# Patient Record
Sex: MALE | Race: WHITE | Employment: OTHER | URBAN - NONMETROPOLITAN AREA
[De-identification: names, ages, dates, MRNs, and addresses within clinical notes are randomized per-mention and may not be internally consistent; named-entity substitution may affect disease eponyms.]

---

## 2017-06-05 DIAGNOSIS — R06.00 DYSPNEA: Primary | ICD-10-CM

## 2017-06-07 ENCOUNTER — HOSPITAL ENCOUNTER (OUTPATIENT)
Dept: ULTRASOUND IMAGING | Facility: HOSPITAL | Age: 67
Discharge: HOME | End: 2017-06-07
Attending: FAMILY MEDICINE | Admitting: FAMILY MEDICINE
Payer: MEDICARE

## 2017-06-07 PROCEDURE — 93306 TTE W/DOPPLER COMPLETE: CPT | Mod: 26 | Performed by: INTERNAL MEDICINE

## 2017-06-07 PROCEDURE — 93306 TTE W/DOPPLER COMPLETE: CPT | Mod: TC

## 2017-06-12 DIAGNOSIS — R06.00 DYSPNEA: Primary | ICD-10-CM

## 2017-06-19 DIAGNOSIS — R06.02 SOB (SHORTNESS OF BREATH): Primary | ICD-10-CM

## 2017-06-20 DIAGNOSIS — M17.12 PRIMARY OSTEOARTHRITIS OF LEFT KNEE: Primary | ICD-10-CM

## 2017-06-20 DIAGNOSIS — M25.562 LEFT KNEE PAIN: ICD-10-CM

## 2017-06-22 DIAGNOSIS — R06.02 SHORTNESS OF BREATH: Primary | ICD-10-CM

## 2017-06-23 ENCOUNTER — HOSPITAL ENCOUNTER (OUTPATIENT)
Dept: NUCLEAR MEDICINE | Facility: HOSPITAL | Age: 67
Discharge: HOME | End: 2017-06-23
Attending: FAMILY MEDICINE | Admitting: FAMILY MEDICINE
Payer: MEDICARE

## 2017-06-23 PROCEDURE — 78452 HT MUSCLE IMAGE SPECT MULT: CPT | Mod: TC

## 2017-06-23 PROCEDURE — 93017 CV STRESS TEST TRACING ONLY: CPT | Mod: TC

## 2017-06-23 PROCEDURE — A9500 TC99M SESTAMIBI: HCPCS | Mod: TC

## 2017-06-23 PROCEDURE — 93016 CV STRESS TEST SUPVJ ONLY: CPT | Performed by: INTERNAL MEDICINE

## 2017-06-23 PROCEDURE — 93018 CV STRESS TEST I&R ONLY: CPT | Performed by: INTERNAL MEDICINE

## 2017-06-23 RX ORDER — REGADENOSON 0.08 MG/ML
0.4 INJECTION, SOLUTION INTRAVENOUS ONCE
Status: COMPLETED | OUTPATIENT
Start: 2017-06-23 | End: 2017-06-23

## 2017-06-23 RX ADMIN — REGADENOSON 0.4 MG: 0.08 INJECTION, SOLUTION INTRAVENOUS at 10:11

## 2017-07-04 ENCOUNTER — HOSPITAL ENCOUNTER (EMERGENCY)
Facility: HOSPITAL | Age: 67
Discharge: HOME | End: 2017-07-04
Attending: NURSE PRACTITIONER | Admitting: NURSE PRACTITIONER
Payer: MEDICARE

## 2017-07-04 VITALS
HEART RATE: 72 BPM | DIASTOLIC BLOOD PRESSURE: 91 MMHG | TEMPERATURE: 97.4 F | OXYGEN SATURATION: 97 % | RESPIRATION RATE: 18 BRPM | SYSTOLIC BLOOD PRESSURE: 155 MMHG

## 2017-07-04 DIAGNOSIS — S42.292A FRACTURE OF HUMERAL HEAD, LEFT, CLOSED, INITIAL ENCOUNTER: ICD-10-CM

## 2017-07-04 PROCEDURE — 99213 OFFICE O/P EST LOW 20 MIN: CPT | Performed by: NURSE PRACTITIONER

## 2017-07-04 PROCEDURE — 99213 OFFICE O/P EST LOW 20 MIN: CPT | Mod: 25

## 2017-07-04 PROCEDURE — 96372 THER/PROPH/DIAG INJ SC/IM: CPT

## 2017-07-04 PROCEDURE — 25000128 H RX IP 250 OP 636: Performed by: NURSE PRACTITIONER

## 2017-07-04 PROCEDURE — 73030 X-RAY EXAM OF SHOULDER: CPT | Mod: TC,LT

## 2017-07-04 PROCEDURE — 25000132 ZZH RX MED GY IP 250 OP 250 PS 637: Mod: GY | Performed by: NURSE PRACTITIONER

## 2017-07-04 PROCEDURE — A9270 NON-COVERED ITEM OR SERVICE: HCPCS | Mod: GY | Performed by: NURSE PRACTITIONER

## 2017-07-04 RX ORDER — OXYCODONE HYDROCHLORIDE AND ACETAMINOPHEN 5; 325 MG/1; MG/1
1 TABLET ORAL ONCE
Status: DISCONTINUED | OUTPATIENT
Start: 2017-07-04 | End: 2017-07-04 | Stop reason: HOSPADM

## 2017-07-04 RX ORDER — OXYCODONE AND ACETAMINOPHEN 5; 325 MG/1; MG/1
1-2 TABLET ORAL EVERY 4 HOURS PRN
Qty: 20 TABLET | Refills: 0 | Status: SHIPPED | OUTPATIENT
Start: 2017-07-04 | End: 2018-09-23

## 2017-07-04 RX ORDER — OXYCODONE AND ACETAMINOPHEN 5; 325 MG/1; MG/1
2 TABLET ORAL ONCE
Status: COMPLETED | OUTPATIENT
Start: 2017-07-04 | End: 2017-07-04

## 2017-07-04 RX ADMIN — OXYCODONE HYDROCHLORIDE AND ACETAMINOPHEN 2 TABLET: 5; 325 TABLET ORAL at 20:12

## 2017-07-04 RX ADMIN — HYDROMORPHONE HYDROCHLORIDE 1 MG: 1 INJECTION, SOLUTION INTRAMUSCULAR; INTRAVENOUS; SUBCUTANEOUS at 18:55

## 2017-07-04 ASSESSMENT — ENCOUNTER SYMPTOMS
ARTHRALGIAS: 1
CONSTITUTIONAL NEGATIVE: 1

## 2017-07-04 NOTE — ED AVS SNAPSHOT
[facility]    [address]    [place] [address]    Phone:  [phone]                                       Serafin Velasquez   MRN: [id number]    Department:  [facility]   Date of Visit:  7/4/2017           After Visit Summary Signature Page     I have received my discharge instructions, and my questions have been answered. I have discussed any challenges I see with this plan with the nurse or doctor.    ..........................................................................................................................................  Patient/Patient Representative Signature      ..........................................................................................................................................  Patient Representative Print Name and Relationship to Patient    ..................................................               ................................................  Date                                            Time    ..........................................................................................................................................  Reviewed by Signature/Title    ...................................................              ..............................................  Date                                                            Time

## 2017-07-04 NOTE — ED AVS SNAPSHOT
[facility]    [address] [address]    Phone:  [phone]                                       Serafin Velasquez   MRN: [id number]    Department:  [facility]   Date of Visit:  7/4/2017           Patient Information     Date Of Birth          1950        Your diagnoses for this visit were:     Fracture of humeral head, left, closed, initial encounter        You were seen by Carlee Calabrese NP.      Follow-up Information     Follow up with [facility].    Specialty:  EMERGENCY MEDICINE    Why:  As needed, If symptoms worsen, or concerns develop    Contact information:    [address] [address] [phone]    Additional information:    From [facility]: Take [address]. Turn left at [address]. Turn left at the first stoplight on [address]. At the first stop sign, take a right onto [address]. Take a left into the parking lot and continue through until you reach the North enterance of the building.       From [place]: Take [address]. Take the [address] ramp towards [place]. Turn left onto [address]. Take a slight right onto [address]. Turn left at the first stoplight on [address]. At the first stop sign, take a right onto [address]. Take a left into the parking lot and continue through until you reach the North enterance of the building.       From [place]: Take [address]. Take a right at [address]. At the first stop sign, take a right onto [address]. Take a left into the parking lot and continue through until you reach the North enterance of the building.         Follow up with [facility]. Call on 7/5/2017.    Why:  [phone] to schedule an appointment for follow up this week in clinic        Discharge Instructions         Understanding a Humerus Fracture      When you have a humerus fracture, it means that  your upper arm bone is broken.This type of fracture most often occurs along the middle of the bone or at the end of the bone near the shoulder. Less often, it occurs at the end of the bone near the elbow. This mainly happens in kids or young adults.  The bone may be cracked, or it may be broken into 2 or more pieces. The pieces of bone may be lined up or they may have moved out of place. Sometimes, the bone may break through the skin. Nearby nerves, tissues, and joints also may be damaged. Depending on the severity of the fracture, healing may take several months or longer.  What causes a humerus fracture?  A humerus fracture is most often the result of trauma. This may be from a fall, blow, accident, or sports injury.  Symptoms of a humerus fracture  Symptoms can include pain, swelling, and bruising. If the bone breaks through the skin,  bleeding can occur at the site. It may be hard to move and use the shoulder, arm, or elbow as you would normally.  Treating a humerus fracture  Treatment depends on where the bone is broken and how serious the break is. If needed, the bone is put back into place. This may be done with or without surgery. If surgery is needed, the surgeon may use devices such as pins, plates, or screws to hold the bone together. You will then wear a sling, splint, or cast to keep the bone in place and protect it from injury during healing. Other treatments may be also used to help reduce symptoms or regain function. These include:    Cold packs. Putting an ice pack on the injured area may help reduce swelling and pain.    Pain medicines. Taking prescription or over-the-counter pain medicines may help reduce pain and swelling.    Exercises. Doing certain exercises at home or with a physical therapist can help improve strength, flexibility, and range of motion in the shoulder, arm, or elbow.  Possible complications of a humerus fracture  These can include:    Poor healing of the bone    Weakness,  stiffness, or loss of range of motion in the shoulder, arm, or elbow    Osteoarthritis in the shoulder or elbow  When to call your healthcare provider  Call your healthcare provider right away if you have any of these:    Fever of 100.4 F (38 C) or higher, or as directed    Symptoms that don t get better with treatment, or get worse    Numbness, tingling, coldness, or swelling in your arm, hand, or fingers    Fingernails that turn blue or gray in color    A sling, splint, or cast that is damaged or feels too tight or loose    New symptoms   Date Last Reviewed: 3/10/2016    5874-1134 [employer]. [address] [place] [address]. All rights reserved. This information is not intended as a substitute for professional medical care. Always follow your healthcare professional's instructions.             Review of your medicines      START taking        Dose / Directions Last dose taken    oxyCODONE-acetaminophen 5-325 MG per tablet   Commonly known as:  PERCOCET   Dose:  1-2 tablet   Quantity:  20 tablet        Take 1-2 tablets by mouth every 4 hours as needed for pain   Refills:  0          Our records show that you are taking the medicines listed below. If these are incorrect, please call your family doctor or clinic.        Dose / Directions Last dose taken    fish oil-omega-3 fatty acids 1000 MG capsule   Dose:  2 g        Take 2 g by mouth daily.   Refills:  0        IMDUR 30 MG 24 hr tablet   Dose:  30 mg   Generic drug:  isosorbide mononitrate        Take 30 mg by mouth daily.   Refills:  0        LIPITOR 20 MG tablet   Dose:  20 mg   Generic drug:  atorvastatin        Take 20 mg by mouth daily.   Refills:  0        multivitamin per tablet   Dose:  1 tablet        Take 1 tablet by mouth daily.   Refills:  0                Prescriptions were sent or printed at these locations (1 Prescription)                   [facility] [address] [facility] [address] AT [facility] [address]  "  [address] [place] [address]    Telephone:  [phone]   Fax:  [phone]   Hours:                  Printed at Department/Unit printer (1 of 1)         oxyCODONE-acetaminophen (PERCOCET) 5-325 MG per tablet                Procedures and tests performed during your visit     Shoulder XR, G/E 3 views, left      Orders Needing Specimen Collection     None      Pending Results     Date and Time Order Name Status Description    2017 1743 Shoulder XR, G/E 3 views, left In process             Pending Culture Results     No orders found from 2017 to 2017.            Thank you for choosing [facility]       Thank you for choosing [facility] for your care. Our goal is always to provide you with excellent care. Hearing back from our patients is one way we can continue to improve our services. Please take a few minutes to complete the written survey that you may receive in the mail after you visit with us. Thank you!        [employer] [facility] Information     Paper[employer] lets you send messages to your doctor, view your test results, renew your prescriptions, schedule appointments and more. To sign up, go to [email or web address] . Click on \"Log in\" on the left side of the screen, which will take you to the Welcome page. Then click on \"Sign up Now\" on the right side of the page.     You will be asked to enter the access code listed below, as well as some personal information. Please follow the directions to create your username and password.     Your access code is: [email or web address]  Expires: 10/2/2017  7:43 PM     Your access code will  in 90 days. If you need help or a new code, please call your [facility] clinic or [phone].        Care EveryWhere ID     This is your Care EveryWhere ID. This could be used by other organizations to access your [facility] medical records  [id number]        Equal Access to Services     ZEE BONNER: Agnes Dumont, randy payan, reema haq " sincere santillan ah. So [facility] [phone].    ATENCIÓN: Si habla español, tiene a boyd disposición servicios gratuitos de asistencia lingüística. Llame al [phone].    We comply with applicable federal civil rights laws and [place] laws. We do not discriminate on the basis of race, color, national origin, age, disability sex, sexual orientation or gender identity.            After Visit Summary       This is your record. Keep this with you and show to your community pharmacist(s) and doctor(s) at your next visit.
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
## 2017-07-05 NOTE — DISCHARGE INSTRUCTIONS
Understanding a Humerus Fracture      When you have a humerus fracture, it means that your upper arm bone is broken.This type of fracture most often occurs along the middle of the bone or at the end of the bone near the shoulder. Less often, it occurs at the end of the bone near the elbow. This mainly happens in kids or young adults.  The bone may be cracked, or it may be broken into 2 or more pieces. The pieces of bone may be lined up or they may have moved out of place. Sometimes, the bone may break through the skin. Nearby nerves, tissues, and joints also may be damaged. Depending on the severity of the fracture, healing may take several months or longer.  What causes a humerus fracture?  A humerus fracture is most often the result of trauma. This may be from a fall, blow, accident, or sports injury.  Symptoms of a humerus fracture  Symptoms can include pain, swelling, and bruising. If the bone breaks through the skin,  bleeding can occur at the site. It may be hard to move and use the shoulder, arm, or elbow as you would normally.  Treating a humerus fracture  Treatment depends on where the bone is broken and how serious the break is. If needed, the bone is put back into place. This may be done with or without surgery. If surgery is needed, the surgeon may use devices such as pins, plates, or screws to hold the bone together. You will then wear a sling, splint, or cast to keep the bone in place and protect it from injury during healing. Other treatments may be also used to help reduce symptoms or regain function. These include:    Cold packs. Putting an ice pack on the injured area may help reduce swelling and pain.    Pain medicines. Taking prescription or over-the-counter pain medicines may help reduce pain and swelling.    Exercises. Doing certain exercises at home or with a physical therapist can help improve strength, flexibility, and range of motion in the shoulder, arm, or elbow.  Possible complications  of a humerus fracture  These can include:    Poor healing of the bone    Weakness, stiffness, or loss of range of motion in the shoulder, arm, or elbow    Osteoarthritis in the shoulder or elbow  When to call your healthcare provider  Call your healthcare provider right away if you have any of these:    Fever of 100.4 F (38 C) or higher, or as directed    Symptoms that don t get better with treatment, or get worse    Numbness, tingling, coldness, or swelling in your arm, hand, or fingers    Fingernails that turn blue or gray in color    A sling, splint, or cast that is damaged or feels too tight or loose    New symptoms   Date Last Reviewed: 3/10/2016    0443-1026 [employer]. [address] [place] [address], [place] [address]. All rights reserved. This information is not intended as a substitute for professional medical care. Always follow your healthcare professional's instructions.

## 2017-07-05 NOTE — ED PROVIDER NOTES
History     Chief Complaint   Patient presents with     Shoulder Pain     tripped and fell landing on his left shoulder.  states he landed on his left shoulder painful to abduct.     HPI  Serafin Velasquez is a 67 year old male who presents this evening with his wife with a CC of left shoulder pain.  He reports he fell this evening just prior to arrival.  [place] with his left arm hitting between the seat and backrest of a metal bench.  He states he also hit his chin on the bench.  He denies LOC, jaw malocclusion, dental injury, neck pain.  He has not taken anything for pain.  He has some dull numb feeling in his left hand but states this is from not moving it.     I have reviewed the Medications, Allergies, Past Medical and Surgical History, and Social History in the Epic system.      Review of Systems   Constitutional: Negative.    Musculoskeletal: Positive for arthralgias.       Physical Exam   BP: 155/91  Pulse: 72  Temp: 97.4  F (36.3  C)  Resp: 18  SpO2: 97 %    Physical Exam   Constitutional: He appears well-developed and well-nourished. He is cooperative.  Non-toxic appearance. He does not appear ill. He appears distressed (appears in pain).   Neck: Normal range of motion and full passive range of motion without pain. Neck supple. Muscular tenderness (left trapezius) present. No spinous process tenderness present. No erythema and normal range of motion present.   Cardiovascular: Normal rate.    Pulmonary/Chest: Effort normal.   Musculoskeletal:        Left shoulder: He exhibits decreased range of motion (minimal ROM of left shoulder due to pain), bony tenderness (anterior and posterior shoulder), swelling (proximal humerus) and decreased strength (due to pain). He exhibits no laceration and normal pulse.        Left elbow: He exhibits normal range of motion, no deformity and no laceration. No tenderness found.        Left wrist: He exhibits normal range of motion,  no bony tenderness, no swelling, no deformity and no laceration.        Left upper arm: He exhibits bony tenderness and swelling. He exhibits no deformity and no laceration.   Left upper extremity: skin intact without erythema.  Skin is normothermic.  Radial pulse 2+, sensation intact, capillary refill < 2 seconds   Neurological: He is alert.   Nursing note and vitals reviewed.      ED Course     ED Course     Procedures    Results for orders placed or performed during the hospital encounter of 07/04/17   Shoulder XR, G/E 3 views, left    Narrative    LEFT SHOULDER FOUR VIEWS    HISTORY:  Pain.    FINDINGS:  There is a comminuted, impacted and angulated fracture  involving the proximal humerus.  Fracture lines are seen extending  through the surgical neck, as well as anatomic neck.  No evidence of  dislocation.    IMPRESSION:  COMMINUTED, MILDLY IMPACTED PROXIMAL HUMERAL FRACTURE  THAT INVOLVES THE SURGICAL NECK, ANATOMIC  NECK, AS WELL AS THE  HUMERAL HEAD.  Exam Date: Jul 04, 2017 06:56:02 PM  Author: MARIA DEL CARMEN SANCHEZ  This report is final and signed       Medications   HYDROmorphone (DILAUDID) injection 1 mg (1 mg Intramuscular Given 7/4/17 1855)   oxyCODONE-acetaminophen (PERCOCET) 5-325 MG per tablet 2 tablet (2 tablets Oral Given 7/4/17 2012)     Patient fitted with left arm sling    Assessments & Plan (with Medical Decision Making)     I have reviewed the nursing notes.    I have reviewed the findings, diagnosis, plan and need for follow up with the patient.  Consulted with Dr Russ, Orthopedic Surgeon on call for [facility], sent images of x-rays to Dr Russ.  Recommendation: sling and follow up with [facility] in [facility] this week.      Discussed above with patient and wife who verbalize understanding of instructions and follow up.  Keep sling on at all times except for bathing.      Take medications as directed.   Return to ED/UC if symptoms increase or concerns develop such as  "those discussed and listed on the \"When to go the Emergency Room\" portion of your discharge instructions.   Patient and wife verbally educated and given appropriate education sheets for their diagnoses and has all questions answered to the best of my ability.        Discharge Medication List as of 7/4/2017  7:43 PM      START taking these medications    Details   oxyCODONE-acetaminophen (PERCOCET) 5-325 MG per tablet Take 1-2 tablets by mouth every 4 hours as needed for pain, Disp-20 tablet, R-0, Local Print             Final diagnoses:   Fracture of humeral head, left, closed, initial encounter       7/4/2017   [facility]     Carlee Calabrese NP  07/05/17 1748    "

## 2017-07-06 NOTE — PROGRESS NOTES
Left Shoulder XR report routed to PCP, Dr. VIJI Fermin.  Impression - Comminuted, mildly impacted proximal humeral fracture involves the surgical neck, anatomic neck, as well as humeral head.  Pt was seen in , SOPHIA Calabrese consulted with Dr Russ, Orthopedic Surgeon on call for [facility], sent images of x-rays to Dr Russ.  Recommendation: sling and follow up with [facility] in [facility] this week.

## 2017-08-02 ENCOUNTER — TRANSFERRED RECORDS (OUTPATIENT)
Dept: HEALTH INFORMATION MANAGEMENT | Facility: HOSPITAL | Age: 67
End: 2017-08-02

## 2017-08-02 ENCOUNTER — MEDICAL CORRESPONDENCE (OUTPATIENT)
Dept: HEALTH INFORMATION MANAGEMENT | Facility: HOSPITAL | Age: 67
End: 2017-08-02

## 2017-08-02 ENCOUNTER — TELEPHONE (OUTPATIENT)
Dept: WOUND CARE | Facility: HOSPITAL | Age: 67
End: 2017-08-02

## 2017-08-02 DIAGNOSIS — L97.912 ULCER OF RIGHT LOWER EXTREMITY WITH FAT LAYER EXPOSED (H): Primary | ICD-10-CM

## 2017-08-02 NOTE — TELEPHONE ENCOUNTER
Serafin called to schedule appt. Referral from Dr. Fermin received on 8/02, next available appt w/ NP offered for 8/10

## 2017-10-25 ENCOUNTER — ALLIED HEALTH/NURSE VISIT (OUTPATIENT)
Dept: FAMILY MEDICINE | Facility: OTHER | Age: 67
End: 2017-10-25
Attending: FAMILY MEDICINE
Payer: MEDICARE

## 2017-10-25 DIAGNOSIS — Z23 NEED FOR PROPHYLACTIC VACCINATION AND INOCULATION AGAINST INFLUENZA: Primary | ICD-10-CM

## 2017-10-25 PROCEDURE — G0008 ADMIN INFLUENZA VIRUS VAC: HCPCS

## 2017-10-25 PROCEDURE — 90662 IIV NO PRSV INCREASED AG IM: CPT

## 2017-10-25 NOTE — MR AVS SNAPSHOT
"              After Visit Summary   10/25/2017    Serafin Velasquez    MRN: [id number]           Patient Information     Date Of Birth          1950        Visit Information        Provider Department      10/25/2017 4:10 PM NA FLU SHOT [facility]        Today's Diagnoses     Need for prophylactic vaccination and inoculation against influenza    -  1       Follow-ups after your visit        Your next 10 appointments already scheduled     Oct 25, 2017  4:10 PM CDT   (Arrive by 3:55 PM)   Flu Shot with NA FLU SHOT CLINIC   [facility] ([facility] )    [address]  [facility] [address]   [phone]              Who to contact     If you have questions or need follow up information about today's clinic visit or your schedule please contact [facility] directly at [phone].  Normal or non-critical lab and imaging results will be communicated to you by MyChart, letter or phone within 4 business days after the clinic has received the results. If you do not hear from us within 7 days, please contact the clinic through MyChart or phone. If you have a critical or abnormal lab result, we will notify you by phone as soon as possible.  Submit refill requests through [email or web address] or call your pharmacy and they will forward the refill request to us. Please allow 3 business days for your refill to be completed.          Additional Information About Your Visit        MyChart Information     [email or web address] lets you send messages to your doctor, view your test results, renew your prescriptions, schedule appointments and more. To sign up, go to [email or web address] . Click on \"Log in\" on the left side of the screen, which will take you to the Welcome page. Then click on \"Sign up Now\" on the right side of the page.     You will be asked to enter the access code listed below, as well as some personal information. Please follow the directions to create your " username and password.     Your access code is: [email or web address]  Expires: 2018  4:04 PM     Your access code will  in 90 days. If you need help or a new code, please call your [facility] clinic or [phone].        Care EveryWhere ID     This is your Care EveryWhere ID. This could be used by other organizations to access your [facility] medical records  [id number]         Blood Pressure from Last 3 Encounters:   17 155/91   10/05/16 142/72   13 128/82    Weight from Last 3 Encounters:   10/05/16 (!) 350 lb (158.8 kg)   13 (!) 330 lb (149.7 kg)   13 (!) 330 lb (149.7 kg)              We Performed the Following     ADMIN INFLUENZA (For MEDICARE Patients ONLY) []     FLU VACCINE, INCREASED ANTIGEN, PRESV FREE, AGE 65+ [99657]        Primary Care Provider Office Phone # Fax #    Mahendra Fermin [phone]       [facility] [address] [address]        Equal Access to Services     [facility] AH: Hadii aad ku hadasho Soomaali, waaxda luqadaha, qaybta kaalmada adeegyada, reema santillan . So [facility] [place] [phone].    ATENCIÓN: Si habla español, tiene a boyd disposición servicios gratuitos de asistencia lingüística. Llame al [phone].    We comply with applicable federal civil rights laws and [place] laws. We do not discriminate on the basis of race, color, national origin, age, disability, sex, sexual orientation, or gender identity.            Thank you!     Thank you for choosing [facility]  for your care. Our goal is always to provide you with excellent care. Hearing back from our patients is one way we can continue to improve our services. Please take a few minutes to complete the written survey that you may receive in the mail after your visit with us. Thank you!             Your Updated Medication List - Protect others around you: Learn how to safely use, store and throw away your medicines at  www.disposemymeds.org.          This list is accurate as of: 10/25/17  4:04 PM.  Always use your most recent med list.                   Brand Name Dispense Instructions for use Diagnosis    fish oil-omega-3 fatty acids 1000 MG capsule      Take 2 g by mouth daily.        IMDUR 30 MG 24 hr tablet   Generic drug:  isosorbide mononitrate      Take 30 mg by mouth daily.        LIPITOR 20 MG tablet   Generic drug:  atorvastatin      Take 20 mg by mouth daily.        multivitamin per tablet      Take 1 tablet by mouth daily.        oxyCODONE-acetaminophen 5-325 MG per tablet    PERCOCET    20 tablet    Take 1-2 tablets by mouth every 4 hours as needed for pain

## 2017-10-25 NOTE — PROGRESS NOTES

## 2018-03-01 LAB — EJECTION FRACTION: 55

## 2018-05-02 ENCOUNTER — TRANSFERRED RECORDS (OUTPATIENT)
Dept: HEALTH INFORMATION MANAGEMENT | Facility: CLINIC | Age: 68
End: 2018-05-02

## 2018-07-03 ENCOUNTER — TRANSFERRED RECORDS (OUTPATIENT)
Dept: HEALTH INFORMATION MANAGEMENT | Facility: HOSPITAL | Age: 68
End: 2018-07-03

## 2018-07-27 ENCOUNTER — TRANSFERRED RECORDS (OUTPATIENT)
Dept: HEALTH INFORMATION MANAGEMENT | Facility: HOSPITAL | Age: 68
End: 2018-07-27

## 2018-08-30 ENCOUNTER — TRANSFERRED RECORDS (OUTPATIENT)
Dept: HEALTH INFORMATION MANAGEMENT | Facility: HOSPITAL | Age: 68
End: 2018-08-30

## 2018-09-23 ENCOUNTER — APPOINTMENT (OUTPATIENT)
Dept: ULTRASOUND IMAGING | Facility: HOSPITAL | Age: 68
End: 2018-09-23
Attending: FAMILY MEDICINE
Payer: MEDICARE

## 2018-09-23 ENCOUNTER — HOSPITAL ENCOUNTER (EMERGENCY)
Facility: HOSPITAL | Age: 68
Discharge: HOME | End: 2018-09-23
Attending: FAMILY MEDICINE | Admitting: FAMILY MEDICINE
Payer: MEDICARE

## 2018-09-23 ENCOUNTER — APPOINTMENT (OUTPATIENT)
Dept: GENERAL RADIOLOGY | Facility: HOSPITAL | Age: 68
End: 2018-09-23
Attending: FAMILY MEDICINE
Payer: MEDICARE

## 2018-09-23 VITALS
RESPIRATION RATE: 22 BRPM | SYSTOLIC BLOOD PRESSURE: 137 MMHG | TEMPERATURE: 97.9 F | OXYGEN SATURATION: 94 % | DIASTOLIC BLOOD PRESSURE: 81 MMHG

## 2018-09-23 DIAGNOSIS — I87.8 VENOUS STASIS: ICD-10-CM

## 2018-09-23 DIAGNOSIS — L02.419 CELLULITIS AND ABSCESS OF LEG: Primary | ICD-10-CM

## 2018-09-23 DIAGNOSIS — L03.119 CELLULITIS AND ABSCESS OF LEG: Primary | ICD-10-CM

## 2018-09-23 LAB
ALBUMIN SERPL-MCNC: 3.1 G/DL (ref 3.4–5)
ALP SERPL-CCNC: 67 U/L (ref 40–150)
ALT SERPL W P-5'-P-CCNC: 19 U/L (ref 0–70)
ANION GAP SERPL CALCULATED.3IONS-SCNC: 14 MMOL/L (ref 3–14)
AST SERPL W P-5'-P-CCNC: 13 U/L (ref 0–45)
BASOPHILS # BLD AUTO: 0 10E9/L (ref 0–0.2)
BASOPHILS NFR BLD AUTO: 0.4 %
BILIRUB SERPL-MCNC: 0.3 MG/DL (ref 0.2–1.3)
BUN SERPL-MCNC: 72 MG/DL (ref 7–30)
CALCIUM SERPL-MCNC: 8.4 MG/DL (ref 8.5–10.1)
CHLORIDE SERPL-SCNC: 96 MMOL/L (ref 94–109)
CO2 SERPL-SCNC: 25 MMOL/L (ref 20–32)
CREAT SERPL-MCNC: 10.8 MG/DL (ref 0.66–1.25)
CRP SERPL-MCNC: 152 MG/L (ref 0–8)
D DIMER PPP DDU-MCNC: 579 NG/ML D-DU (ref 0–300)
DIFFERENTIAL METHOD BLD: ABNORMAL
EOSINOPHIL # BLD AUTO: 0.2 10E9/L (ref 0–0.7)
EOSINOPHIL NFR BLD AUTO: 1.8 %
ERYTHROCYTE [DISTWIDTH] IN BLOOD BY AUTOMATED COUNT: 14.9 % (ref 10–15)
GFR SERPL CREATININE-BSD FRML MDRD: 5 ML/MIN/1.7M2
GLUCOSE SERPL-MCNC: 108 MG/DL (ref 70–99)
HCT VFR BLD AUTO: 36.3 % (ref 40–53)
HGB BLD-MCNC: 11.8 G/DL (ref 13.3–17.7)
IMM GRANULOCYTES # BLD: 0.2 10E9/L (ref 0–0.4)
IMM GRANULOCYTES NFR BLD: 1.6 %
LACTATE SERPL-SCNC: 1.2 MMOL/L (ref 0.4–2)
LYMPHOCYTES # BLD AUTO: 0.8 10E9/L (ref 0.8–5.3)
LYMPHOCYTES NFR BLD AUTO: 7.6 %
MCH RBC QN AUTO: 30.2 PG (ref 26.5–33)
MCHC RBC AUTO-ENTMCNC: 32.5 G/DL (ref 31.5–36.5)
MCV RBC AUTO: 93 FL (ref 78–100)
MONOCYTES # BLD AUTO: 1 10E9/L (ref 0–1.3)
MONOCYTES NFR BLD AUTO: 9.5 %
NEUTROPHILS # BLD AUTO: 8.7 10E9/L (ref 1.6–8.3)
NEUTROPHILS NFR BLD AUTO: 79.1 %
NRBC # BLD AUTO: 0 10*3/UL
NRBC BLD AUTO-RTO: 0 /100
NT-PROBNP SERPL-MCNC: 2082 PG/ML (ref 0–900)
PHOSPHATE SERPL-MCNC: 6.4 MG/DL (ref 2.5–4.5)
PLATELET # BLD AUTO: 133 10E9/L (ref 150–450)
POTASSIUM SERPL-SCNC: 4.1 MMOL/L (ref 3.4–5.3)
PROCALCITONIN SERPL-MCNC: 1.25 NG/ML
PROT SERPL-MCNC: 8.8 G/DL (ref 6.8–8.8)
RBC # BLD AUTO: 3.91 10E12/L (ref 4.4–5.9)
SODIUM SERPL-SCNC: 135 MMOL/L (ref 133–144)
WBC # BLD AUTO: 10.9 10E9/L (ref 4–11)

## 2018-09-23 PROCEDURE — 83880 ASSAY OF NATRIURETIC PEPTIDE: CPT | Performed by: FAMILY MEDICINE

## 2018-09-23 PROCEDURE — 86140 C-REACTIVE PROTEIN: CPT | Performed by: FAMILY MEDICINE

## 2018-09-23 PROCEDURE — 84100 ASSAY OF PHOSPHORUS: CPT | Performed by: FAMILY MEDICINE

## 2018-09-23 PROCEDURE — 84145 PROCALCITONIN (PCT): CPT | Performed by: FAMILY MEDICINE

## 2018-09-23 PROCEDURE — 36415 COLL VENOUS BLD VENIPUNCTURE: CPT | Performed by: FAMILY MEDICINE

## 2018-09-23 PROCEDURE — A9270 NON-COVERED ITEM OR SERVICE: HCPCS | Mod: GY | Performed by: EMERGENCY MEDICINE

## 2018-09-23 PROCEDURE — 85379 FIBRIN DEGRADATION QUANT: CPT | Performed by: FAMILY MEDICINE

## 2018-09-23 PROCEDURE — 93970 EXTREMITY STUDY: CPT | Mod: TC

## 2018-09-23 PROCEDURE — 87040 BLOOD CULTURE FOR BACTERIA: CPT | Performed by: FAMILY MEDICINE

## 2018-09-23 PROCEDURE — 71045 X-RAY EXAM CHEST 1 VIEW: CPT | Mod: TC

## 2018-09-23 PROCEDURE — 99285 EMERGENCY DEPT VISIT HI MDM: CPT | Mod: 25

## 2018-09-23 PROCEDURE — 83605 ASSAY OF LACTIC ACID: CPT | Performed by: FAMILY MEDICINE

## 2018-09-23 PROCEDURE — 80053 COMPREHEN METABOLIC PANEL: CPT | Performed by: FAMILY MEDICINE

## 2018-09-23 PROCEDURE — 85025 COMPLETE CBC W/AUTO DIFF WBC: CPT | Performed by: FAMILY MEDICINE

## 2018-09-23 PROCEDURE — 25000132 ZZH RX MED GY IP 250 OP 250 PS 637: Mod: GY | Performed by: EMERGENCY MEDICINE

## 2018-09-23 PROCEDURE — 99284 EMERGENCY DEPT VISIT MOD MDM: CPT | Mod: Z6 | Performed by: FAMILY MEDICINE

## 2018-09-23 RX ORDER — CLINDAMYCIN HCL 300 MG
300 CAPSULE ORAL 4 TIMES DAILY
Qty: 40 CAPSULE | Refills: 0 | Status: SHIPPED | OUTPATIENT
Start: 2018-09-23 | End: 2018-10-03

## 2018-09-23 RX ORDER — IPRATROPIUM BROMIDE AND ALBUTEROL SULFATE 2.5; .5 MG/3ML; MG/3ML
1 SOLUTION RESPIRATORY (INHALATION) 4 TIMES DAILY
COMMUNITY

## 2018-09-23 RX ORDER — CLINDAMYCIN HCL 150 MG
300 CAPSULE ORAL ONCE
Status: COMPLETED | OUTPATIENT
Start: 2018-09-23 | End: 2018-09-23

## 2018-09-23 RX ORDER — SACCHAROMYCES BOULARDII 250 MG
250 CAPSULE ORAL 2 TIMES DAILY
Qty: 28 CAPSULE | Refills: 1 | Status: ON HOLD | OUTPATIENT
Start: 2018-09-23 | End: 2018-10-22

## 2018-09-23 RX ORDER — ALBUTEROL SULFATE 90 UG/1
1 AEROSOL, METERED RESPIRATORY (INHALATION) EVERY 4 HOURS PRN
COMMUNITY

## 2018-09-23 RX ADMIN — CLINDAMYCIN HYDROCHLORIDE 300 MG: 150 CAPSULE ORAL at 20:44

## 2018-09-23 NOTE — ED NOTES
Updated MD on lab results. Hx renal failure. Dialysis pt. Dialyzed at 2pm today. Does peritoneal dialysis at home for approximately 2.5 hours. Updated MD.

## 2018-09-23 NOTE — ED AVS SNAPSHOT
[facility]    [address] [place] [address]    Phone:  [phone]                                       Serafin Velasquez   MRN: [id number]    Department:  [facility]   Date of Visit:  9/23/2018           After Visit Summary Signature Page     I have received my discharge instructions, and my questions have been answered. I have discussed any challenges I see with this plan with the nurse or doctor.    ..........................................................................................................................................  Patient/Patient Representative Signature      ..........................................................................................................................................  Patient Representative Print Name and Relationship to Patient    ..................................................               ................................................  Date                                   Time    ..........................................................................................................................................  Reviewed by Signature/Title    ...................................................              ..............................................  Date                                               Time          22EPIC Rev 08/18

## 2018-09-23 NOTE — ED PROVIDER NOTES
History     Chief Complaint   Patient presents with     Cellulitis     c/o leg pain and redness, notes temp at home     HPI  Serafin Velasquez is a 68 year old male who presents with concern of left leg infection . Patient states has had increased swelling in both legs since last week . Has history of lymphedema. Also has had increased burning . Today had a low grade temp of 99.  Has history of problems of cellulitis in his legs and has been admitted to the hospital , last when he was in [place] for the winter 4 years ago . Patient denies being diabetic although it is documented on his problem list in his chart .     Problem List:    Patient Active Problem List    Diagnosis Date Noted     Diabetes (H)      Priority: Medium     Chronic kidney disease      Priority: Medium     Sebaceous cyst 05/07/2013     Priority: Medium     DM (diabetes mellitus) with complications (H) 04/30/2013     Priority: Medium     KRISTIAN (obstructive sleep apnea) 04/30/2013     Priority: Medium     Obese abdomen 04/30/2013     Priority: Medium     Depression 04/30/2013     Priority: Medium     Lymphedema of lower extremity 04/30/2013     Priority: Medium     Venous stasis      Priority: Medium     GERD (gastroesophageal reflux disease)      Priority: Medium     Persistent proteinuria      Priority: Medium     Phlebitis or thrombophlebitis of lower extremity (H)      Priority: Medium     Problem list name updated by automated process. Provider to review          Past Medical History:    Past Medical History:   Diagnosis Date     Abnormal glucose 2001     Acute MI 1999     Cardiovascular disease 1999     Cellulitis of leg without foot 2001     Chronic kidney disease      Coronary artery disease      Diabetes (H)      Dysphagia 2001     Epistaxis 2004     GERD (gastroesophageal reflux disease) 2008     HTN (hypertension) 2004     Hyperchylomicronaemia 2004     Hyperlipidemia 2003     Osteoarthritis 2002     Other nonspecific finding on examination  of urine 2001     Peptic disease 2004     Persistent proteinuria 2008     Phlebitis and thrombophlebitis of lower extremities, unspecified (H) 2001     Septicemia (H) 2001     Sleep apnea      Undiagnosed cardiac murmurs 2007     Venous stasis 2008       Past Surgical History:    Past Surgical History:   Procedure Laterality Date     EXCISE CYST GENERIC (LOCATION)  5/2/2013    Procedure: EXCISE CYST GENERIC (LOCATION);  EXCISION OF SEBACEOUS CYST, MID BACK x 2  ;  Surgeon: Jaida Gaffney DO;  Location: [facility]     ORTHOPEDIC SURGERY      bilateral knee arthroscopy       Family History:    Family History   Problem Relation Age of Onset     C.A.D. Father      Diabetes Father      Hypertension Father        Social History:  Marital Status:   [2]  Social History   Substance Use Topics     Smoking status: Current Every Day Smoker     Packs/day: 0.12     Smokeless tobacco: Current User     Types: Chew     Alcohol use No        Medications:      AmLODIPine Besylate (NORVASC PO)   Ascorbic Acid (VITAMIN C PO)   ASPIRIN PO   atorvastatin (LIPITOR) 20 MG tablet   calcium acetate (PHOSLO) 667 MG CAPS capsule   calcium carbonate 600 mg-vitamin D 400 units (CALTRATE) 600-400 MG-UNIT per tablet   Carvedilol (COREG PO)   Famotidine (PEPCID PO)   fish oil-omega-3 fatty acids (FISH OIL) 1000 MG capsule   Furosemide (LASIX PO)   GLUCOSAMINE SULFATE PO   ipratropium - albuterol 0.5 mg/2.5 mg/3 mL (DUONEB) 0.5-2.5 (3) MG/3ML neb solution   isosorbide mononitrate (IMDUR) 30 MG 24 hr tablet   Mirabegron (MYRBETRIQ PO)   Multiple Vitamin (MULTIVITAMIN) per tablet   PREDNISONE PO   Sertraline HCl (ZOLOFT PO)   UNABLE TO FIND   UNABLE TO FIND   Acetaminophen (TYLENOL PO)   albuterol (PROAIR HFA/PROVENTIL HFA/VENTOLIN HFA) 108 (90 Base) MCG/ACT inhaler         Review of Systems   Constitutional: Positive for fever. Negative for unexpected weight change.   HENT: Negative.    Respiratory: Negative.    Cardiovascular: Positive for leg  swelling. Negative for palpitations.   Gastrointestinal: Negative.    Musculoskeletal: Negative.    Hematological: Negative.    Psychiatric/Behavioral: Negative.        Physical Exam          Physical Exam   Constitutional: He is oriented to person, place, and time. He appears well-developed and well-nourished. No distress.   HENT:   Head: Normocephalic and atraumatic.   Right Ear: External ear normal.   Left Ear: External ear normal.   Nose: Nose normal.   Mouth/Throat: Oropharynx is clear and moist. No oropharyngeal exudate.   Eyes: Pupils are equal, round, and reactive to light. Right eye exhibits no discharge. Left eye exhibits no discharge. No scleral icterus.   Neck: Normal range of motion. Neck supple.   Cardiovascular: Normal rate and regular rhythm.    Pulmonary/Chest: Effort normal and breath sounds normal.   Abdominal: Soft. Bowel sounds are normal. He exhibits no distension.   Neurological: He is alert and oriented to person, place, and time.   Skin: Skin is warm. No rash noted. He is not diaphoretic. There is erythema. No pallor.   Toribio venous stasis changes bilaterally with erythema and warmth . No open areas . 3 plus edema    Nursing note and vitals reviewed.      ED Course     ED Course     Procedures          Patient presents to ER with concen of possible cellulitis Patient triage to exam room . Vital signs reviewed. History and exam completed. Labs and diagnostics ordered. D dimer elevated Venous doppler ultrasound ordered., negative for DVT . Patient discharged with prescription for oral clindamycin with instructions to follow up with primary care provider        No results found for this or any previous visit (from the past 24 hour(s)).    Medications - No data to display    Assessments & Plan (with Medical Decision Making)     I have reviewed the nursing notes.    I have reviewed the findings, diagnosis, plan and need for follow up with the patient.      New Prescriptions    No medications on  file       Final diagnoses:   Cellulitis and abscess of leg   Venous stasis       9/23/2018   [facility]     Natasha Baxter MD  09/25/18 1562

## 2018-09-23 NOTE — ED NOTES
Patient presents to emergency room with c/o left calf swelling and pain. Hx lymphedema and cellulitis. Wife states left calf swollen and red. Noted bilateral lower edema L >R. Erythema noted to left calf. No drainage noted. Drainage noted to right lower calf. Denies any numbness or tingling. Denies fever. C/o chills last night. Pt requesting to sit in a chair.

## 2018-09-23 NOTE — ED AVS SNAPSHOT
[facility]    [address] [address]    Phone:  [phone]                                       Serafin Velasquez   MRN: [id number]    Department:  [facility]   Date of Visit:  9/23/2018           Patient Information     Date Of Birth          1950        Your diagnoses for this visit were:     Cellulitis and abscess of leg     Venous stasis        You were seen by Natasha Baxter MD.      Follow-up Information     Follow up with Mahendra Fermin MD In 3 days.    Specialty:  Family Practice    Why:  Re-evaluation, Continuity of care    Contact information:    [facility]  [address] [id number] [phone]          Discharge Instructions         Discharge Instructions for Cellulitis  You have been diagnosed with cellulitis. This is an infection in the deepest layer of the skin. In some cases, the infection also affects the muscle. Cellulitis is caused by bacteria. The bacteria can enter the body through broken skin. This can happen with a cut, scratch, animal bite, or an insect bite that has been scratched. You may have been treated in the hospital with antibiotics and fluids. You will likely be given a prescription for antibiotics to take at home. This sheet will help you take care of yourself at home.  Home care  When you are home:    Take the prescribed antibiotic medicine you are given as directed until it is gone. Take it even if you feel better. It treats the infection and stops it from returning. Not taking all the medicine can make future infections hard to treat.    Keep the infected area clean.    When possible, raise the infected area above the level of your heart. This helps keep swelling down.    Talk with your healthcare provider if you are in pain. Ask what kind of over-the-counter medicine you can take for pain.    Apply clean bandages as advised.    Take your temperature once a day for a week.    Wash  your hands often to prevent spreading the infection.  In the future, wash your hands before and after you touch cuts, scratches, or bandages. This will help prevent infection.   When to call your healthcare provider  Call your healthcare provider immediately if you have any of the following:    Difficulty or pain when moving the joints above or below the infected area    Discharge or pus draining from the area    Fever of 100.4 F (38 C) or higher, or as directed by your healthcare provider    Pain that gets worse in or around the infected     Redness that gets worse in or around the infected area, particularly if the area of redness expands to a wider area    Shaking chills    Swelling of the infected area    Vomiting   Date Last Reviewed: 8/1/2016 2000-2017 [employer]. [address] [place] [address], [place] [address]. All rights reserved. This information is not intended as a substitute for professional medical care. Always follow your healthcare professional's instructions.             Review of your medicines      START taking        Dose / Directions Last dose taken    clindamycin 300 MG capsule   Commonly known as:  CLEOCIN   Dose:  300 mg   Quantity:  40 capsule        Take 1 capsule (300 mg) by mouth 4 times daily for 10 days   Refills:  0        saccharomyces boulardii 250 MG capsule   Commonly known as:  FLORASTOR   Dose:  250 mg   Quantity:  28 capsule        Take 1 capsule (250 mg) by mouth 2 times daily   Refills:  1          Our records show that you are taking the medicines listed below. If these are incorrect, please call your family doctor or clinic.        Dose / Directions Last dose taken    albuterol 108 (90 Base) MCG/ACT inhaler   Commonly known as:  PROAIR HFA/PROVENTIL HFA/VENTOLIN HFA   Dose:  1 puff        Inhale 1 puff into the lungs every 4 hours as needed for shortness of breath / dyspnea or wheezing   Refills:  0        ASPIRIN PO   Dose:  81 mg        Take 81 mg by mouth daily    Refills:  0        calcium acetate 667 MG Caps capsule   Commonly known as:  PHOSLO   Dose:  667 mg        Take 667 mg by mouth 3 times daily (with meals)   Refills:  0        calcium carbonate 600 mg-vitamin D 400 units 600-400 MG-UNIT per tablet   Commonly known as:  CALTRATE   Dose:  1 tablet        Take 1 tablet by mouth 2 times daily   Refills:  0        COREG PO   Dose:  12.5 mg        Take 12.5 mg by mouth 2 times daily (with meals)   Refills:  0        fish oil-omega-3 fatty acids 1000 MG capsule   Dose:  1 g        Take 1 g by mouth 2 times daily   Refills:  0        GLUCOSAMINE SULFATE PO   Dose:  1500 mg        Take 1,500 mg by mouth daily   Refills:  0        IMDUR 30 MG 24 hr tablet   Dose:  30 mg   Generic drug:  isosorbide mononitrate        Take 30 mg by mouth daily.   Refills:  0        ipratropium - albuterol 0.5 mg/2.5 mg/3 mL 0.5-2.5 (3) MG/3ML neb solution   Commonly known as:  DUONEB   Dose:  1 vial        Take 1 vial by nebulization 4 times daily   Refills:  0        LASIX PO   Dose:  160 mg        Take 160 mg by mouth daily   Refills:  0        LIPITOR 20 MG tablet   Dose:  20 mg   Generic drug:  atorvastatin        Take 20 mg by mouth daily.   Refills:  0        multivitamin per tablet   Dose:  1 tablet        Take 1 tablet by mouth daily.   Refills:  0        MYRBETRIQ PO   Dose:  25 mg        Take 25 mg by mouth daily   Refills:  0        NORVASC PO   Dose:  5 mg        Take 5 mg by mouth daily   Refills:  0        PEPCID PO   Dose:  20 mg        Take 20 mg by mouth 2 times daily   Refills:  0        PREDNISONE PO   Dose:  5 mg        Take 5 mg by mouth daily   Refills:  0        TYLENOL PO   Dose:  325 mg        Take 325 mg by mouth every 6 hours as needed for mild pain or fever   Refills:  0        UNABLE TO FIND        MEDICATION NAME: vitamin d3 1,000 units daily   Refills:  0        UNABLE TO FIND        MEDICATION NAME: breo ellipta 1 puff daily   Refills:  0        VITAMIN C PO    Dose:  500 mg        Take 500 mg by mouth daily   Refills:  0        ZOLOFT PO   Dose:  100 mg        Take 100 mg by mouth 2 times daily   Refills:  0                Prescriptions were sent or printed at these locations (2 Prescriptions)                   [id number] - [place], [place] - [address] AT [facility] OF  [address]   [address], [place] [address]    Telephone:  [phone]   Fax:  [phone]   Hours:                  E-Prescribed (2 of 2)         clindamycin (CLEOCIN) 300 MG capsule               saccharomyces boulardii (FLORASTOR) 250 MG capsule                Procedures and tests performed during your visit     Procedure/Test Number of Times Performed    Blood culture 2    CBC with platelets differential 1    CRP inflammation 1    Chest  XR, 1 view portable 1    Comprehensive metabolic panel 1    D-Dimer ([facility]) 1    Lactic acid 1    NT pro BNP 1    Phosphorus 2    Procalcitonin 1    US Lower Extremity Venous Duplex Bilateral 1      Orders Needing Specimen Collection     Ordered          09/23/18 1835  UA reflex to Microscopic - ROUTINE, Prio: Routine, Status: Sent     Scheduled Task Status   09/23/18 1836 [facility] Reminder: Open   Order Class:  PCU Collect                  Pending Results     Date and Time Order Name Status Description    9/23/2018 1920 US Lower Extremity Venous Duplex Bilateral In process     9/23/2018 1835 Chest  XR, 1 view portable In process     9/23/2018 1751 Phosphorus In process     9/23/2018 1750 Blood culture In process     9/23/2018 1750 Blood culture In process             Pending Culture Results     Date and Time Order Name Status Description    9/23/2018 1750 Blood culture In process     9/23/2018 1750 Blood culture In process             Thank you for choosing [facility]       Thank you for choosing [facility] for your care. Our goal is always to provide you with excellent care. Hearing back from our patients is one way we can continue to improve  "our services. Please take a few minutes to complete the written survey that you may receive in the mail after you visit with us. Thank you!        [employer] Information     [employer] lets you send messages to your doctor, view your test results, renew your prescriptions, schedule appointments and more. To sign up, go to [email or web address] . Click on \"Log in\" on the left side of the screen, which will take you to the Welcome page. Then click on \"Sign up Now\" on the right side of the page.     You will be asked to enter the access code listed below, as well as some personal information. Please follow the directions to create your username and password.     Your access code is: [email or web address]  Expires: 2018  8:38 PM     Your access code will  in 90 days. If you need help or a new code, please call your [facility] clinic or [phone].        Care EveryWhere ID     This is your Care EveryWhere ID. This could be used by other organizations to access your [facility] medical records  [id number]        Equal Access to Services     [facility] : Hadii ally Dumont, waaxda ora, qaybjonny kaalsven carr, reema santillan . So [facility] [phone].    ATENCIÓN: Si habla español, tiene a boyd disposición servicios gratuitos de asistencia lingüística. Chastity al [phone].    We comply with applicable federal civil rights laws and [place] laws. We do not discriminate on the basis of race, color, national origin, age, disability, sex, sexual orientation, or gender identity.            After Visit Summary       This is your record. Keep this with you and show to your community pharmacist(s) and doctor(s) at your next visit.                  "

## 2018-09-24 NOTE — ED NOTES
Reviewed discharge instructions with pt and wife, verbalized understanding, copy if AVS provided prior to leaving, oral clindamycin tablet given prior to leaving and informed prescription for Clindamycin and Florastor was E-Scribed to [facility] pharmacy. Declined vitals prior to leaving.

## 2018-09-24 NOTE — ED NOTES
Face to face report given with opportunity to observe patient.    Report given to Mariah RUBIO   9/23/2018  7:10 PM

## 2018-09-24 NOTE — ED PROVIDER NOTES
Patient handed over from Dr. Brown at shift change at 8 PM, please see her notes  Diagnosis:  Cellulitis both legs  Chronic venous stasis of both legs  Presented to the emergency department and evaluated by Dr. Brown.  Tentative diagnosis of cellulitis made.  Ultrasound done to rule out DVT and ultrasound was negative for DVT.  Now patient is being discharged home on clindamycin and follow-up with primary care physician next week.  Written and verbal instructions given.  Discharge from ED     Andreas Nagel MD  09/23/18 2909

## 2018-09-24 NOTE — DISCHARGE INSTRUCTIONS
Discharge Instructions for Cellulitis  You have been diagnosed with cellulitis. This is an infection in the deepest layer of the skin. In some cases, the infection also affects the muscle. Cellulitis is caused by bacteria. The bacteria can enter the body through broken skin. This can happen with a cut, scratch, animal bite, or an insect bite that has been scratched. You may have been treated in the hospital with antibiotics and fluids. You will likely be given a prescription for antibiotics to take at home. This sheet will help you take care of yourself at home.  Home care  When you are home:    Take the prescribed antibiotic medicine you are given as directed until it is gone. Take it even if you feel better. It treats the infection and stops it from returning. Not taking all the medicine can make future infections hard to treat.    Keep the infected area clean.    When possible, raise the infected area above the level of your heart. This helps keep swelling down.    Talk with your healthcare provider if you are in pain. Ask what kind of over-the-counter medicine you can take for pain.    Apply clean bandages as advised.    Take your temperature once a day for a week.    Wash your hands often to prevent spreading the infection.  In the future, wash your hands before and after you touch cuts, scratches, or bandages. This will help prevent infection.   When to call your healthcare provider  Call your healthcare provider immediately if you have any of the following:    Difficulty or pain when moving the joints above or below the infected area    Discharge or pus draining from the area    Fever of 100.4 F (38 C) or higher, or as directed by your healthcare provider    Pain that gets worse in or around the infected     Redness that gets worse in or around the infected area, particularly if the area of redness expands to a wider area    Shaking chills    Swelling of the infected area    Vomiting   Date Last Reviewed:  8/1/2016 2000-2017 [employer]. [address] [place] [address], [place] [address]. All rights reserved. This information is not intended as a substitute for professional medical care. Always follow your healthcare professional's instructions.

## 2018-09-25 ASSESSMENT — ENCOUNTER SYMPTOMS
RESPIRATORY NEGATIVE: 1
GASTROINTESTINAL NEGATIVE: 1
PALPITATIONS: 0
PSYCHIATRIC NEGATIVE: 1
FEVER: 1
UNEXPECTED WEIGHT CHANGE: 0
HEMATOLOGIC/LYMPHATIC NEGATIVE: 1
MUSCULOSKELETAL NEGATIVE: 1

## 2018-09-28 ENCOUNTER — TRANSFERRED RECORDS (OUTPATIENT)
Dept: HEALTH INFORMATION MANAGEMENT | Facility: CLINIC | Age: 68
End: 2018-09-28

## 2018-09-30 LAB
BACTERIA SPEC CULT: NORMAL
BACTERIA SPEC CULT: NORMAL
Lab: NORMAL
Lab: NORMAL
SPECIMEN SOURCE: NORMAL
SPECIMEN SOURCE: NORMAL

## 2018-10-09 ENCOUNTER — OFFICE VISIT (OUTPATIENT)
Dept: SLEEP MEDICINE | Facility: HOSPITAL | Age: 68
End: 2018-10-09
Attending: FAMILY MEDICINE
Payer: MEDICARE

## 2018-10-09 DIAGNOSIS — R09.02 HYPOXIA: Primary | ICD-10-CM

## 2018-10-09 NOTE — MR AVS SNAPSHOT
"              After Visit Summary   10/9/2018    Serafin Velasquez    MRN: [id number]           Patient Information     Date Of Birth          1950        Visit Information        Provider Department      10/9/2018 12:45 PM [facility]        Today's Diagnoses     Hypoxia    -  1       Follow-ups after your visit        Your next 10 appointments already scheduled     Oct 10, 2018  9:15 AM CDT   Oximetry Drop Off with [facility]   [facility] ([facility] )    [address] [id number] [phone]            Oct 22, 2018   Procedure with Segundo Power MD   [facility] ([facility] )    [address] [id number] [phone]              Who to contact     If you have questions or need follow up information about today's clinic visit or your schedule please contact [facility] directly at [phone].  Normal or non-critical lab and imaging results will be communicated to you by [employer], letter or phone within 4 business days after the clinic has received the results. If you do not hear from us within 7 days, please contact the clinic through [employer] or phone. If you have a critical or abnormal lab result, we will notify you by phone as soon as possible.  Submit refill requests through [employer] or call your pharmacy and they will forward the refill request to us. Please allow 3 business days for your refill to be completed.          Additional Information About Your Visit        Friend[employer]ithart Information     [employer] lets you send messages to your doctor, view your test results, renew your prescriptions, schedule appointments and more. To sign up, go to [email or web address] . Click on \"Log in\" on the left side of the screen, which will take you to the Welcome page. Then click on \"Sign up Now\" on the right side of the page.     You will be asked to enter the access code listed below, as well as some personal information. Please " follow the directions to create your username and password.     Your access code is: [email or web address]  Expires: 2018  8:38 PM     Your access code will  in 90 days. If you need help or a new code, please call your [facility] clinic or [phone].        Care EveryWhere ID     This is your Care EveryWhere ID. This could be used by other organizations to access your [facility] medical records  [id number]         Blood Pressure from Last 3 Encounters:   18 137/81   17 155/91   10/05/16 142/72    Weight from Last 3 Encounters:   10/05/16 (!) 350 lb (158.8 kg)   13 (!) 330 lb (149.7 kg)   13 (!) 330 lb (149.7 kg)              Today, you had the following     No orders found for display       Primary Care Provider Office Phone # Fax #    Mahendra Fermin [phone]       [facility] [address] [address]        Equal Access to Services     [facility]: Hadii ally ku hadasho Soomaali, waaxda luqadaha, qaybta kaalmada adeyaw, reema santillan . So [facility] [place] [phone].    ATENCIÓN: Si habla español, tiene a boyd disposición servicios gratuitos de asistencia lingüística. [facility] [phone].    We comply with applicable federal civil rights laws and [place] laws. We do not discriminate on the basis of race, color, national origin, age, disability, sex, sexual orientation, or gender identity.            Thank you!     Thank you for choosing [facility]  for your care. Our goal is always to provide you with excellent care. Hearing back from our patients is one way we can continue to improve our services. Please take a few minutes to complete the written survey that you may receive in the mail after your visit with us. Thank you!             Your Updated Medication List - Protect others around you: Learn how to safely use, store and throw away your medicines at www.disposemymeds.org.          This list is accurate as of 10/9/18   2:29 PM.  Always use your most recent med list.                   Brand Name Dispense Instructions for use Diagnosis    albuterol 108 (90 Base) MCG/ACT inhaler    PROAIR HFA/PROVENTIL HFA/VENTOLIN HFA     Inhale 1 puff into the lungs every 4 hours as needed for shortness of breath / dyspnea or wheezing        ASPIRIN PO      Take 81 mg by mouth daily        calcium acetate 667 MG Caps capsule    PHOSLO     Take 667 mg by mouth 3 times daily (with meals)        calcium carbonate 600 mg-vitamin D 400 units 600-400 MG-UNIT per tablet    CALTRATE     Take 1 tablet by mouth 2 times daily        COREG PO      Take 12.5 mg by mouth 2 times daily (with meals)        fish oil-omega-3 fatty acids 1000 MG capsule      Take 1 g by mouth 2 times daily        GLUCOSAMINE SULFATE PO      Take 1,500 mg by mouth daily        IMDUR 30 MG 24 hr tablet   Generic drug:  isosorbide mononitrate      Take 30 mg by mouth daily.        ipratropium - albuterol 0.5 mg/2.5 mg/3 mL 0.5-2.5 (3) MG/3ML neb solution    DUONEB     Take 1 vial by nebulization 4 times daily        LASIX PO      Take 160 mg by mouth daily        LIPITOR 20 MG tablet   Generic drug:  atorvastatin      Take 20 mg by mouth daily.        multivitamin per tablet      Take 1 tablet by mouth daily.        MYRBETRIQ PO      Take 25 mg by mouth daily        NORVASC PO      Take 5 mg by mouth daily        PEPCID PO      Take 20 mg by mouth 2 times daily        PREDNISONE PO      Take 5 mg by mouth daily        saccharomyces boulardii 250 MG capsule    FLORASTOR    28 capsule    Take 1 capsule (250 mg) by mouth 2 times daily        TYLENOL PO      Take 325 mg by mouth every 6 hours as needed for mild pain or fever        UNABLE TO FIND      MEDICATION NAME: vitamin d3 1,000 units daily        UNABLE TO FIND      MEDICATION NAME: breo ellipta 1 puff daily        VITAMIN C PO      Take 500 mg by mouth daily        ZOLOFT PO      Take 100 mg by mouth 2 times daily

## 2018-10-09 NOTE — PROGRESS NOTES
Patient picked up over night oximeter number SL-1. Patient was instructed on use of device. Patient verbalized understanding.     Patient will return device tomorrow by:noon

## 2018-10-16 ENCOUNTER — DOCUMENTATION ONLY (OUTPATIENT)
Dept: SLEEP MEDICINE | Facility: HOSPITAL | Age: 68
End: 2018-10-16
Attending: FAMILY MEDICINE
Payer: MEDICARE

## 2018-10-16 DIAGNOSIS — R09.02 HYPOXIA: Primary | ICD-10-CM

## 2018-10-16 NOTE — PROGRESS NOTES
Patient dropped of the overnight oximeter and the data was downloaded and the report sent to Dr. Fermin and sent to scan.

## 2018-10-19 ENCOUNTER — TRANSFERRED RECORDS (OUTPATIENT)
Dept: HEALTH INFORMATION MANAGEMENT | Facility: HOSPITAL | Age: 68
End: 2018-10-19

## 2018-10-19 ENCOUNTER — APPOINTMENT (OUTPATIENT)
Dept: LAB | Facility: HOSPITAL | Age: 68
End: 2018-10-19
Attending: FAMILY MEDICINE
Payer: MEDICARE

## 2018-10-19 ENCOUNTER — RESULTS ONLY (OUTPATIENT)
Dept: LAB | Age: 68
End: 2018-10-19

## 2018-10-19 ENCOUNTER — ANESTHESIA EVENT (OUTPATIENT)
Dept: SURGERY | Facility: HOSPITAL | Age: 68
End: 2018-10-19
Payer: MEDICARE

## 2018-10-19 LAB
ANION GAP SERPL CALCULATED.3IONS-SCNC: 14 MMOL/L (ref 3–14)
BUN SERPL-MCNC: 72 MG/DL (ref 7–30)
CALCIUM SERPL-MCNC: 9 MG/DL (ref 8.5–10.1)
CHLORIDE SERPL-SCNC: 101 MMOL/L (ref 94–109)
CO2 SERPL-SCNC: 25 MMOL/L (ref 20–32)
CREAT SERPL-MCNC: 12.1 MG/DL (ref 0.66–1.25)
GFR SERPL CREATININE-BSD FRML MDRD: 4 ML/MIN/1.7M2
GLUCOSE SERPL-MCNC: 99 MG/DL (ref 70–99)
INR PPP: 1.07 (ref 0.8–1.2)
POTASSIUM SERPL-SCNC: 4 MMOL/L (ref 3.4–5.3)
SODIUM SERPL-SCNC: 140 MMOL/L (ref 133–144)

## 2018-10-19 ASSESSMENT — COPD QUESTIONNAIRES
COPD: 1
CAT_SEVERITY: MODERATE

## 2018-10-19 ASSESSMENT — LIFESTYLE VARIABLES: TOBACCO_USE: 1

## 2018-10-22 ENCOUNTER — SURGERY (OUTPATIENT)
Age: 68
End: 2018-10-22

## 2018-10-22 ENCOUNTER — HOSPITAL ENCOUNTER (OUTPATIENT)
Facility: HOSPITAL | Age: 68
Discharge: HOME | End: 2018-10-22
Attending: OPHTHALMOLOGY | Admitting: OPHTHALMOLOGY
Payer: MEDICARE

## 2018-10-22 ENCOUNTER — ANESTHESIA (OUTPATIENT)
Dept: SURGERY | Facility: HOSPITAL | Age: 68
End: 2018-10-22
Payer: MEDICARE

## 2018-10-22 VITALS
BODY MASS INDEX: 40.43 KG/M2 | SYSTOLIC BLOOD PRESSURE: 132 MMHG | OXYGEN SATURATION: 93 % | DIASTOLIC BLOOD PRESSURE: 69 MMHG | RESPIRATION RATE: 18 BRPM | WEIGHT: 315 LBS | TEMPERATURE: 97.8 F | HEIGHT: 74 IN

## 2018-10-22 PROCEDURE — 25000132 ZZH RX MED GY IP 250 OP 250 PS 637: Mod: GY | Performed by: OPHTHALMOLOGY

## 2018-10-22 PROCEDURE — 36000056 ZZH SURGERY LEVEL 3 1ST 30 MIN: Performed by: OPHTHALMOLOGY

## 2018-10-22 PROCEDURE — 66984 XCAPSL CTRC RMVL W/O ECP: CPT | Performed by: ANESTHESIOLOGY

## 2018-10-22 PROCEDURE — 25000128 H RX IP 250 OP 636: Performed by: NURSE ANESTHETIST, CERTIFIED REGISTERED

## 2018-10-22 PROCEDURE — V2632 POST CHMBR INTRAOCULAR LENS: HCPCS | Performed by: OPHTHALMOLOGY

## 2018-10-22 PROCEDURE — 71000027 ZZH RECOVERY PHASE 2 EACH 15 MINS: Performed by: OPHTHALMOLOGY

## 2018-10-22 PROCEDURE — 27210794 ZZH OR GENERAL SUPPLY STERILE: Performed by: OPHTHALMOLOGY

## 2018-10-22 PROCEDURE — 25000128 H RX IP 250 OP 636: Performed by: OPHTHALMOLOGY

## 2018-10-22 PROCEDURE — A9270 NON-COVERED ITEM OR SERVICE: HCPCS | Mod: GY | Performed by: OPHTHALMOLOGY

## 2018-10-22 PROCEDURE — 25000125 ZZHC RX 250: Performed by: OPHTHALMOLOGY

## 2018-10-22 PROCEDURE — 37000008 ZZH ANESTHESIA TECHNICAL FEE, 1ST 30 MIN: Performed by: OPHTHALMOLOGY

## 2018-10-22 PROCEDURE — 66984 XCAPSL CTRC RMVL W/O ECP: CPT | Performed by: NURSE ANESTHETIST, CERTIFIED REGISTERED

## 2018-10-22 PROCEDURE — C9447 INJ, PHENYLEPHRINE KETOROLAC: HCPCS | Performed by: OPHTHALMOLOGY

## 2018-10-22 PROCEDURE — 40000306 ZZH STATISTIC PRE PROC ASSESS II: Performed by: OPHTHALMOLOGY

## 2018-10-22 DEVICE — IMPLANTABLE DEVICE: Type: IMPLANTABLE DEVICE | Site: EYE | Status: FUNCTIONAL

## 2018-10-22 RX ORDER — TETRACAINE HYDROCHLORIDE 5 MG/ML
SOLUTION OPHTHALMIC PRN
Status: DISCONTINUED | OUTPATIENT
Start: 2018-10-22 | End: 2018-10-22 | Stop reason: HOSPADM

## 2018-10-22 RX ORDER — SENNOSIDES 8.6 MG
2 TABLET ORAL 2 TIMES DAILY PRN
Status: ON HOLD | COMMUNITY
End: 2018-10-22

## 2018-10-22 RX ORDER — MOXIFLOXACIN 5 MG/ML
SOLUTION/ DROPS OPHTHALMIC PRN
Status: DISCONTINUED | OUTPATIENT
Start: 2018-10-22 | End: 2018-10-22 | Stop reason: HOSPADM

## 2018-10-22 RX ORDER — CYCLOPENTOLATE HYDROCHLORIDE 20 MG/ML
1 SOLUTION/ DROPS OPHTHALMIC
Status: COMPLETED | OUTPATIENT
Start: 2018-10-22 | End: 2018-10-22

## 2018-10-22 RX ORDER — LEVOBUNOLOL HYDROCHLORIDE 5 MG/ML
SOLUTION/ DROPS OPHTHALMIC PRN
Status: DISCONTINUED | OUTPATIENT
Start: 2018-10-22 | End: 2018-10-22 | Stop reason: HOSPADM

## 2018-10-22 RX ORDER — SODIUM CHLORIDE, SODIUM LACTATE, POTASSIUM CHLORIDE, CALCIUM CHLORIDE 600; 310; 30; 20 MG/100ML; MG/100ML; MG/100ML; MG/100ML
INJECTION, SOLUTION INTRAVENOUS CONTINUOUS
Status: DISCONTINUED | OUTPATIENT
Start: 2018-10-22 | End: 2018-10-22 | Stop reason: HOSPADM

## 2018-10-22 RX ORDER — PROPARACAINE HYDROCHLORIDE 5 MG/ML
1 SOLUTION/ DROPS OPHTHALMIC ONCE
Status: COMPLETED | OUTPATIENT
Start: 2018-10-22 | End: 2018-10-22

## 2018-10-22 RX ORDER — SODIUM CHLORIDE 9 MG/ML
INJECTION, SOLUTION INTRAVENOUS CONTINUOUS
Status: DISCONTINUED | OUTPATIENT
Start: 2018-10-22 | End: 2018-10-22 | Stop reason: HOSPADM

## 2018-10-22 RX ORDER — ONDANSETRON 2 MG/ML
4 INJECTION INTRAMUSCULAR; INTRAVENOUS EVERY 30 MIN PRN
Status: DISCONTINUED | OUTPATIENT
Start: 2018-10-22 | End: 2018-10-22 | Stop reason: HOSPADM

## 2018-10-22 RX ORDER — ACETAMINOPHEN 325 MG/1
650 TABLET ORAL ONCE
Status: COMPLETED | OUTPATIENT
Start: 2018-10-22 | End: 2018-10-22

## 2018-10-22 RX ORDER — PILOCARPINE HYDROCHLORIDE 10 MG/ML
SOLUTION/ DROPS OPHTHALMIC PRN
Status: DISCONTINUED | OUTPATIENT
Start: 2018-10-22 | End: 2018-10-22 | Stop reason: HOSPADM

## 2018-10-22 RX ORDER — ONDANSETRON 4 MG/1
4 TABLET, ORALLY DISINTEGRATING ORAL EVERY 30 MIN PRN
Status: DISCONTINUED | OUTPATIENT
Start: 2018-10-22 | End: 2018-10-22 | Stop reason: HOSPADM

## 2018-10-22 RX ORDER — MEPERIDINE HYDROCHLORIDE 50 MG/ML
12.5 INJECTION INTRAMUSCULAR; INTRAVENOUS; SUBCUTANEOUS
Status: DISCONTINUED | OUTPATIENT
Start: 2018-10-22 | End: 2018-10-22 | Stop reason: HOSPADM

## 2018-10-22 RX ORDER — PHENYLEPHRINE HYDROCHLORIDE 100 MG/ML
1 SOLUTION/ DROPS OPHTHALMIC
Status: DISCONTINUED | OUTPATIENT
Start: 2018-10-22 | End: 2018-10-22 | Stop reason: HOSPADM

## 2018-10-22 RX ORDER — LIDOCAINE HYDROCHLORIDE 10 MG/ML
INJECTION, SOLUTION EPIDURAL; INFILTRATION; INTRACAUDAL; PERINEURAL PRN
Status: DISCONTINUED | OUTPATIENT
Start: 2018-10-22 | End: 2018-10-22 | Stop reason: HOSPADM

## 2018-10-22 RX ORDER — NALOXONE HYDROCHLORIDE 0.4 MG/ML
.1-.4 INJECTION, SOLUTION INTRAMUSCULAR; INTRAVENOUS; SUBCUTANEOUS
Status: DISCONTINUED | OUTPATIENT
Start: 2018-10-22 | End: 2018-10-22 | Stop reason: HOSPADM

## 2018-10-22 RX ORDER — PHENYLEPHRINE HYDROCHLORIDE 25 MG/ML
1 SOLUTION/ DROPS OPHTHALMIC
Status: COMPLETED | OUTPATIENT
Start: 2018-10-22 | End: 2018-10-22

## 2018-10-22 RX ADMIN — PHENYLEPHRINE HYDROCHLORIDE 1 DROP: 25 SOLUTION/ DROPS OPHTHALMIC at 12:56

## 2018-10-22 RX ADMIN — ACETAMINOPHEN 650 MG: 325 TABLET, FILM COATED ORAL at 12:53

## 2018-10-22 RX ADMIN — PILOCARPINE HYDROCHLORIDE 1 DROP: 10 SOLUTION/ DROPS OPHTHALMIC at 14:25

## 2018-10-22 RX ADMIN — PHENYLEPHRINE HYDROCHLORIDE 1 DROP: 25 SOLUTION/ DROPS OPHTHALMIC at 13:01

## 2018-10-22 RX ADMIN — MOXIFLOXACIN 0.5 ML: 5 SOLUTION/ DROPS OPHTHALMIC at 13:01

## 2018-10-22 RX ADMIN — TETRACAINE HYDROCHLORIDE 2 DROP: 5 SOLUTION OPHTHALMIC at 14:26

## 2018-10-22 RX ADMIN — CYCLOPENTOLATE HYDROCHLORIDE 1 DROP: 20 SOLUTION/ DROPS OPHTHALMIC at 13:01

## 2018-10-22 RX ADMIN — Medication 0.8 ML: at 14:25

## 2018-10-22 RX ADMIN — MOXIFLOXACIN HYDROCHLORIDE 1 DROP: 5 SOLUTION/ DROPS OPHTHALMIC at 14:25

## 2018-10-22 RX ADMIN — MIDAZOLAM 1 MG: 1 INJECTION INTRAMUSCULAR; INTRAVENOUS at 14:10

## 2018-10-22 RX ADMIN — CYCLOPENTOLATE HYDROCHLORIDE 1 DROP: 20 SOLUTION/ DROPS OPHTHALMIC at 12:56

## 2018-10-22 RX ADMIN — LEVOBUNOLOL HYDROCHLORIDE 1 DROP: 5 SOLUTION/ DROPS OPHTHALMIC at 14:24

## 2018-10-22 RX ADMIN — PHENYLEPHRINE AND KETOROLAC 300 ML: 10.16; 2.88 INJECTION, SOLUTION, CONCENTRATE INTRAOCULAR at 14:25

## 2018-10-22 RX ADMIN — LIDOCAINE HYDROCHLORIDE 1 ML: 10 INJECTION, SOLUTION EPIDURAL; INFILTRATION; INTRACAUDAL; PERINEURAL at 14:25

## 2018-10-22 RX ADMIN — PROPARACAINE HYDROCHLORIDE 1 DROP: 5 SOLUTION/ DROPS OPHTHALMIC at 12:55

## 2018-10-22 NOTE — IP AVS SNAPSHOT
MRN:[id number]                      After Visit Summary   10/22/2018    Serafin Velasquez    MRN: [id number]           Thank you!     Thank you for choosing [facility] for your care. Our goal is always to provide you with excellent care. Hearing back from our patients is one way we can continue to improve our services. Please take a few minutes to complete the written survey that you may receive in the mail after you visit with us. Thank you!        Patient Information     Date Of Birth          1950        About your hospital stay     You were admitted on:  October 22, 2018 You last received care in the:  [facility] Preop/Phase II    You were discharged on:  October 22, 2018       Who to Call     For medical emergencies, please call 911.  For non-urgent questions about your medical care, please call your primary care provider or clinic, [phone]  For questions related to your surgery, please call your surgery clinic        Attending Provider     Provider Specialty    Segundo Power MD Ophthalmology       Primary Care Provider Office Phone # Fax #    Mahendra Fermin [phone]      After Care Instructions     Nursing Communication 1       Eyedrops, shield, and activities per post op pamphlet.            Patient care order       Patient has Vigamox (moxifloxacin) and Prednisolone 1% and Ketorolac drops at home.  These should be used:  Vigamox (moxifloxacin) 1 drop operative eye QID for 1 week.  Prednisolone 1% 1 drop operatve eye QID for 1 week then TID for 5 weeks.  Ketorolac 1 drop operative eye QID for 1 week, then TID for 5 weeks or until gone.                  Further instructions from your care team           Post-Anesthesia Patient Instructions    IMMEDIATELY FOLLOWING SURGERY:  Do not drive or operate machinery for the first twenty four hours after surgery.  Do not make any important decisions for twenty four hours after surgery or while taking narcotic pain medications or  "sedatives.  If you develop intractable nausea and vomiting or a severe headache please notify your doctor immediately.    FOLLOW-UP:  Please make an appointment with your surgeon as instructed. You do not need to follow up with anesthesia unless specifically instructed to do so.    WOUND CARE INSTRUCTIONS (if applicable):  Keep a dry clean dressing on the anesthesia/puncture wound site if there is drainage.  Once the wound has quit draining you may leave it open to air.  Generally you should leave the bandage intact for twenty four hours unless there is drainage.  If the epidural site drains for more than 36-48 hours please call the anesthesia department.    QUESTIONS?:  Please feel free to call your physician or the hospital  if you have any questions, and they will be happy to assist you.       Pending Results     No orders found from 10/20/2018 to 10/23/2018.            Admission Information     Date & Time Provider Department Dept. Phone    10/22/2018 Segundo Power MD [facility] Preop/Phase [phone]      Your Vitals Were     Blood Pressure Temperature Respirations Height Weight Pulse Oximetry    145/69 98.3  F (36.8  C) 18 1.88 m (6' 2\") 176.9 kg (390 lb) 96%    BMI (Body Mass Index)                   50.07 kg/m2           MyChart Information     [employer] lets you send messages to your doctor, view your test results, renew your prescriptions, schedule appointments and more. To sign up, go to [email or web address] . Click on \"Log in\" on the left side of the screen, which will take you to the Welcome page. Then click on \"Sign up Now\" on the right side of the page.     You will be asked to enter the access code listed below, as well as some personal information. Please follow the directions to create your username and password.     Your access code is: [email or web address]  Expires: 2018  8:38 PM     Your access code will  in 90 days. If you need help or a new code, please call your [facility] clinic " or [phone].        Care EveryWhere ID     This is your Care EveryWhere ID. This could be used by other organizations to access your [facility] medical records  [id number]        Equal Access to Services     ZEE GUNN : Hadii aad ku hadshaunnatoan Latanyaalexander, waaxda luqadaha, qaybta kaalmada lilly, reema silvestrein hayaaha nath gregcheryl goode. So [facility] [phone].    ATENCIÓN: Si habla español, tiene a boyd disposición servicios gratuitos de asistencia lingüística. Llame al [phone].    We comply with applicable federal civil rights laws and [place] laws. We do not discriminate on the basis of race, color, national origin, age, disability, sex, sexual orientation, or gender identity.               Review of your medicines      CONTINUE these medicines which have NOT CHANGED        Dose / Directions    albuterol 108 (90 Base) MCG/ACT inhaler   Commonly known as:  PROAIR HFA/PROVENTIL HFA/VENTOLIN HFA        Dose:  1 puff   Inhale 1 puff into the lungs every 4 hours as needed for shortness of breath / dyspnea or wheezing   Refills:  0       ASPIRIN PO        Dose:  81 mg   Take 81 mg by mouth daily   Refills:  0       BREO ELLIPTA 200-25 MCG/INH inhaler   Generic drug:  fluticasone-vilanterol        Dose:  1 puff   Inhale 1 puff into the lungs daily   Refills:  0       calcium acetate 667 MG Caps capsule   Commonly known as:  PHOSLO        Dose:  667 mg   Take 667 mg by mouth 3 times daily (with meals)   Refills:  0       calcium carbonate 600 mg-vitamin D 400 units 600-400 MG-UNIT per tablet   Commonly known as:  CALTRATE        Dose:  1 tablet   Take 1 tablet by mouth 2 times daily   Refills:  0       fish oil-omega-3 fatty acids 1000 MG capsule        Dose:  1 g   Take 1 g by mouth 2 times daily   Refills:  0       GLUCOSAMINE SULFATE PO        Dose:  1500 mg   Take 1,500 mg by mouth daily   Refills:  0       IMDUR 30 MG 24 hr tablet   Generic drug:  isosorbide mononitrate        Dose:  30 mg   Take 30 mg by  mouth daily.   Refills:  0       ipratropium - albuterol 0.5 mg/2.5 mg/3 mL 0.5-2.5 (3) MG/3ML neb solution   Commonly known as:  DUONEB        Dose:  1 vial   Take 1 vial by nebulization 4 times daily   Refills:  0       LASIX PO        Dose:  160 mg   Take 160 mg by mouth   Refills:  0       LIPITOR 20 MG tablet   Generic drug:  atorvastatin        Dose:  20 mg   Take 20 mg by mouth daily.   Refills:  0       LOPRESSOR PO        Dose:  25 mg   Take 25 mg by mouth   Refills:  0       multivitamin per tablet        Dose:  1 tablet   Take 1 tablet by mouth daily.   Refills:  0       PEPCID PO        Dose:  20 mg   Take 20 mg by mouth 2 times daily   Refills:  0       PREDNISONE PO        Dose:  5 mg   Take 5 mg by mouth daily   Refills:  0       TYLENOL PO        Dose:  325 mg   Take 325 mg by mouth every 6 hours as needed for mild pain or fever   Refills:  0       UNABLE TO FIND        MEDICATION NAME: vitamin d3 2,000 units daily   Refills:  0       VITAMIN C PO        Dose:  500 mg   Take 500 mg by mouth daily   Refills:  0       ZOLOFT PO        Dose:  100 mg   Take 100 mg by mouth 2 times daily   Refills:  0                Protect others around you: Learn how to safely use, store and throw away your medicines at www.disposemymeds.org.             Medication List: This is a list of all your medications and when to take them. Check marks below indicate your daily home schedule. Keep this list as a reference.      Medications           Morning Afternoon Evening Bedtime As Needed    albuterol 108 (90 Base) MCG/ACT inhaler   Commonly known as:  PROAIR HFA/PROVENTIL HFA/VENTOLIN HFA   Inhale 1 puff into the lungs every 4 hours as needed for shortness of breath / dyspnea or wheezing                                ASPIRIN PO   Take 81 mg by mouth daily                                BREO ELLIPTA 200-25 MCG/INH inhaler   Inhale 1 puff into the lungs daily   Generic drug:  fluticasone-vilanterol                                 calcium acetate 667 MG Caps capsule   Commonly known as:  PHOSLO   Take 667 mg by mouth 3 times daily (with meals)                                calcium carbonate 600 mg-vitamin D 400 units 600-400 MG-UNIT per tablet   Commonly known as:  CALTRATE   Take 1 tablet by mouth 2 times daily                                fish oil-omega-3 fatty acids 1000 MG capsule   Take 1 g by mouth 2 times daily                                GLUCOSAMINE SULFATE PO   Take 1,500 mg by mouth daily                                IMDUR 30 MG 24 hr tablet   Take 30 mg by mouth daily.   Generic drug:  isosorbide mononitrate                                ipratropium - albuterol 0.5 mg/2.5 mg/3 mL 0.5-2.5 (3) MG/3ML neb solution   Commonly known as:  DUONEB   Take 1 vial by nebulization 4 times daily                                LASIX PO   Take 160 mg by mouth                                LIPITOR 20 MG tablet   Take 20 mg by mouth daily.   Generic drug:  atorvastatin                                LOPRESSOR PO   Take 25 mg by mouth                                multivitamin per tablet   Take 1 tablet by mouth daily.                                PEPCID PO   Take 20 mg by mouth 2 times daily                                PREDNISONE PO   Take 5 mg by mouth daily                                TYLENOL PO   Take 325 mg by mouth every 6 hours as needed for mild pain or fever   Last time this was given:  650 mg on 10/22/2018 12:53 PM                                UNABLE TO FIND   MEDICATION NAME: vitamin d3 2,000 units daily                                VITAMIN C PO   Take 500 mg by mouth daily                                ZOLOFT PO   Take 100 mg by mouth 2 times daily

## 2018-10-22 NOTE — INTERVAL H&P NOTE
History and physical reviewed. Patient examined. No interval change in condition.  Segunod Power

## 2018-10-22 NOTE — OP NOTE
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  Lidocaine 2% jelly topically and Lidocaine 1% preservative-free intracamerally.     PROCEDURE:  The patient was brought to the Operating Room and prepped and draped in a sterile manner.  A wire lid speculum was placed.  A paracentesis was created and 1% Lidocaine was instilled in the anterior chamber.  The anterior chamber was then filled with Amvisc viscoelastic.  A clear cornea temporal wound was created using a 2.8 mm keratome.  A cystotome was used to initiate a flap in the anterior capsule and a Utrata forceps was used to create a continuous tear capsulorhexis.  Hydrodissection was performed.  The phacoemulsification tip was inserted into the eye and the nucleus and epinucleus were removed using a divide and conquer technique.  The residual cortex was removed using the I/A handpiece.  The anterior chamber was then refilled with viscoelastic and the wound was enlarged with the keratome.  The intraocular lens, 10.5 diopter, Model LI61AO, was placed into the injector and injected into the capsular bag. It was checked to make sure that it was central and stable.  Residual viscoelastic was removed using the I/A.  The anterior chamber was refilled with BSS.  The wounds were hydrated with BSS and were noted to be watertight with no suture necessary.  Topical pilocarpine 1%, Betagan, and Vigamox was applied.  A hard shield was  placed.     The patient tolerated the procedure well and was sent to the Recovery Room in satisfactory condition.

## 2018-10-22 NOTE — ANESTHESIA CARE TRANSFER NOTE
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    Anesthesia Type:   MAC     Note:  Airway :Room Air  Patient transferred to:Phase II  Comments: Patient to Phase II on RA/native airway and is awake, alert, and verbal. Report to RN and transfer of care. VSS      Vitals: (Last set prior to Anesthesia Care Transfer)    CRNA VITALS  10/22/2018 1403 - 10/22/2018 1437      10/22/2018             Resp Rate (set): 8                Electronically Signed By: ALLA Alex CRNA  October 22, 2018  2:37 PM

## 2018-10-22 NOTE — IP AVS SNAPSHOT
[facility] Preop/Phase II    [address] [address]    Phone:  [phone]                                       After Visit Summary   10/22/2018    Serafin Velasquez    MRN: [id number]           After Visit Summary Signature Page     I have received my discharge instructions, and my questions have been answered. I have discussed any challenges I see with this plan with the nurse or doctor.    ..........................................................................................................................................  Patient/Patient Representative Signature      ..........................................................................................................................................  Patient Representative Print Name and Relationship to Patient    ..................................................               ................................................  Date                                   Time    ..........................................................................................................................................  Reviewed by Signature/Title    ...................................................              ..............................................  Date                                               Time          22EPIC Rev 08/18

## 2018-10-22 NOTE — OR NURSING
Patient and responsible adult given discharge instructions with no questions regarding instructions. Zabrina score 10. Pain level 0/10.  Discharged from unit via W/C. Patient discharged to home.

## 2018-10-22 NOTE — ANESTHESIA POSTPROCEDURE EVALUATION
Patient: Serafin Velasquez    Procedure(s):  PHACOEMULSIFICATION CATARACT EXTRACTION POSTERIOR CHAMBER LENS RIGHT    Diagnosis:COMBINED FORMS OF COMBINED CATARACT RIGHT EYE  Diagnosis Additional Information: No value filed.    Anesthesia Type:  MAC    Note:  Anesthesia Post Evaluation    Patient location during evaluation: PACU  Patient participation: Able to fully participate in evaluation  Level of consciousness: awake and alert  Pain management: adequate  Airway patency: patent  Cardiovascular status: acceptable  Respiratory status: acceptable  Hydration status: acceptable  PONV: none             Last vitals:  Vitals:    10/22/18 1445 10/22/18 1450 10/22/18 1455   BP: 124/72 144/76 137/74   Resp: 18 18 18   Temp:      SpO2: 96% 95% 94%         Electronically Signed By: Basilio Alonso MD  October 22, 2018  3:00 PM

## 2018-10-23 ENCOUNTER — TRANSFERRED RECORDS (OUTPATIENT)
Dept: HEALTH INFORMATION MANAGEMENT | Facility: HOSPITAL | Age: 68
End: 2018-10-23

## 2018-11-12 ENCOUNTER — ANESTHESIA EVENT (OUTPATIENT)
Dept: SURGERY | Facility: HOSPITAL | Age: 68
End: 2018-11-12
Payer: MEDICARE

## 2018-11-12 RX ORDER — ONDANSETRON 4 MG/1
4 TABLET, ORALLY DISINTEGRATING ORAL EVERY 30 MIN PRN
Status: CANCELLED | OUTPATIENT
Start: 2018-11-12

## 2018-11-12 RX ORDER — LABETALOL HYDROCHLORIDE 5 MG/ML
10 INJECTION, SOLUTION INTRAVENOUS
Status: CANCELLED | OUTPATIENT
Start: 2018-11-12

## 2018-11-12 RX ORDER — FENTANYL CITRATE 50 UG/ML
25-50 INJECTION, SOLUTION INTRAMUSCULAR; INTRAVENOUS
Status: CANCELLED | OUTPATIENT
Start: 2018-11-12

## 2018-11-12 RX ORDER — NALOXONE HYDROCHLORIDE 0.4 MG/ML
.1-.4 INJECTION, SOLUTION INTRAMUSCULAR; INTRAVENOUS; SUBCUTANEOUS
Status: CANCELLED | OUTPATIENT
Start: 2018-11-12 | End: 2018-11-13

## 2018-11-12 RX ORDER — ONDANSETRON 2 MG/ML
4 INJECTION INTRAMUSCULAR; INTRAVENOUS EVERY 30 MIN PRN
Status: CANCELLED | OUTPATIENT
Start: 2018-11-12

## 2018-11-12 RX ORDER — OXYCODONE HYDROCHLORIDE 5 MG/1
5 TABLET ORAL EVERY 4 HOURS PRN
Status: CANCELLED | OUTPATIENT
Start: 2018-11-12

## 2018-11-12 RX ORDER — HYDRALAZINE HYDROCHLORIDE 20 MG/ML
2.5-5 INJECTION INTRAMUSCULAR; INTRAVENOUS EVERY 10 MIN PRN
Status: CANCELLED | OUTPATIENT
Start: 2018-11-12

## 2018-11-12 RX ORDER — ALBUTEROL SULFATE 0.83 MG/ML
2.5 SOLUTION RESPIRATORY (INHALATION) EVERY 4 HOURS PRN
Status: CANCELLED | OUTPATIENT
Start: 2018-11-12

## 2018-11-12 RX ORDER — HYDROMORPHONE HYDROCHLORIDE 1 MG/ML
.3-.5 INJECTION, SOLUTION INTRAMUSCULAR; INTRAVENOUS; SUBCUTANEOUS EVERY 10 MIN PRN
Status: CANCELLED | OUTPATIENT
Start: 2018-11-12

## 2018-11-12 RX ORDER — DEXAMETHASONE SODIUM PHOSPHATE 4 MG/ML
4 INJECTION, SOLUTION INTRA-ARTICULAR; INTRALESIONAL; INTRAMUSCULAR; INTRAVENOUS; SOFT TISSUE EVERY 10 MIN PRN
Status: CANCELLED | OUTPATIENT
Start: 2018-11-12

## 2018-11-12 ASSESSMENT — LIFESTYLE VARIABLES: TOBACCO_USE: 1

## 2018-11-12 ASSESSMENT — COPD QUESTIONNAIRES: COPD: 1

## 2018-11-12 NOTE — ANESTHESIA PREPROCEDURE EVALUATION
Anesthesia Evaluation     . Pt has had prior anesthetic.     No history of anesthetic complications          ROS/MED HX    ENT/Pulmonary:     (+)sleep apnea, KRISTIAN risk factors (BMI: 50.18) hypertension, obese, tobacco use, Current use 0.5 PPD packs/day  asthma Treatment: Inhaler daily,  moderate COPD, uses CPAP , . .    Neurologic:  - neg neurologic ROS     Cardiovascular:     (+) Dyslipidemia, hypertension-Peripheral Vascular Disease (Venous Insufficiency )-- Other, CAD, --. : . . . :. valvular problems/murmurs . Previous cardiac testing date:results:date: results:ECG reviewed date:7/27/2018 results:SB@59 w/ Marked Sinus Arrhythmia, LAD, RBBBCath date: 2012 results:Cath negative in 2012          METS/Exercise Tolerance:  1 - Eating, dressing   Hematologic:     (+) History of blood clots Anemia, -      Musculoskeletal:   (+) arthritis, , , -       GI/Hepatic:     (+) GERD Asymptomatic on medication,       Renal/Genitourinary:     (+) chronic renal disease (5 cycles at night, 2 during day), type: CRI and ESRD, Pt requires dialysis, type: Peritoneal dialysis, Pt has no history of transplant,       Endo:     (+) Chronic steroid usage for Date most recently used: <5mg/QD,Obesity, .      Psychiatric:     (+) psychiatric history depression      Infectious Disease:  - neg infectious disease ROS       Malignancy:      - no malignancy   Other:    - neg other ROS                 Physical Exam  Normal systems: pulmonary    Airway   Mallampati: IV  TM distance: >3 FB  Neck ROM: full    Dental   (+) partials    Cardiovascular   Rhythm and rate: regular and normal      Pulmonary    breath sounds clear to auscultation                    Anesthesia Plan      History & Physical Review  History and physical reviewed and following examination; no interval change.    ASA Status:  4 .        Plan for MAC with Other induction. Maintenance will be Other.  Reason for MAC:  Chronic cardiopulmonary disease (G9), Procedure to face, neck,  head or breast and Deep or markedly invasive procedure (G8)  PONV prophylaxis:  Ondansetron (or other 5HT-3)  Risks and benefits of MAC anesthetic discussed including dental damage, aspiration, loss of airway, conversion to general anesthetic, CV complications, MI, stroke, death. Pt wishes to proceed.       Postoperative Care  Postoperative pain management:  IV analgesics, Oral pain medications and Multi-modal analgesia.      Consents  Anesthetic plan, risks, benefits and alternatives discussed with:  Patient..                          .

## 2018-11-13 ENCOUNTER — HOSPITAL ENCOUNTER (OUTPATIENT)
Facility: HOSPITAL | Age: 68
Discharge: HOME | End: 2018-11-13
Attending: OPHTHALMOLOGY | Admitting: OPHTHALMOLOGY
Payer: MEDICARE

## 2018-11-13 ENCOUNTER — ANESTHESIA (OUTPATIENT)
Dept: SURGERY | Facility: HOSPITAL | Age: 68
End: 2018-11-13
Payer: MEDICARE

## 2018-11-13 VITALS
HEIGHT: 74 IN | TEMPERATURE: 97.7 F | BODY MASS INDEX: 40.43 KG/M2 | OXYGEN SATURATION: 98 % | WEIGHT: 315 LBS | RESPIRATION RATE: 18 BRPM | DIASTOLIC BLOOD PRESSURE: 58 MMHG | SYSTOLIC BLOOD PRESSURE: 120 MMHG

## 2018-11-13 PROCEDURE — 71000027 ZZH RECOVERY PHASE 2 EACH 15 MINS: Performed by: OPHTHALMOLOGY

## 2018-11-13 PROCEDURE — C9447 INJ, PHENYLEPHRINE KETOROLAC: HCPCS | Performed by: OPHTHALMOLOGY

## 2018-11-13 PROCEDURE — 37000008 ZZH ANESTHESIA TECHNICAL FEE, 1ST 30 MIN: Performed by: OPHTHALMOLOGY

## 2018-11-13 PROCEDURE — 27210794 ZZH OR GENERAL SUPPLY STERILE: Performed by: OPHTHALMOLOGY

## 2018-11-13 PROCEDURE — 25000128 H RX IP 250 OP 636: Performed by: OPHTHALMOLOGY

## 2018-11-13 PROCEDURE — 25000132 ZZH RX MED GY IP 250 OP 250 PS 637: Mod: GY | Performed by: OPHTHALMOLOGY

## 2018-11-13 PROCEDURE — 66984 XCAPSL CTRC RMVL W/O ECP: CPT | Performed by: NURSE ANESTHETIST, CERTIFIED REGISTERED

## 2018-11-13 PROCEDURE — 66984 XCAPSL CTRC RMVL W/O ECP: CPT | Performed by: ANESTHESIOLOGY

## 2018-11-13 PROCEDURE — 36000056 ZZH SURGERY LEVEL 3 1ST 30 MIN: Performed by: OPHTHALMOLOGY

## 2018-11-13 PROCEDURE — 40000305 ZZH STATISTIC PRE PROC ASSESS I: Performed by: OPHTHALMOLOGY

## 2018-11-13 PROCEDURE — V2632 POST CHMBR INTRAOCULAR LENS: HCPCS | Performed by: OPHTHALMOLOGY

## 2018-11-13 PROCEDURE — A9270 NON-COVERED ITEM OR SERVICE: HCPCS | Mod: GY | Performed by: OPHTHALMOLOGY

## 2018-11-13 PROCEDURE — 25000125 ZZHC RX 250: Performed by: OPHTHALMOLOGY

## 2018-11-13 DEVICE — IMPLANTABLE DEVICE: Type: IMPLANTABLE DEVICE | Site: EYE | Status: FUNCTIONAL

## 2018-11-13 RX ORDER — CYCLOPENTOLATE HYDROCHLORIDE 20 MG/ML
1 SOLUTION/ DROPS OPHTHALMIC
Status: COMPLETED | OUTPATIENT
Start: 2018-11-13 | End: 2018-11-13

## 2018-11-13 RX ORDER — ACETAMINOPHEN 325 MG/1
650 TABLET ORAL ONCE
Status: COMPLETED | OUTPATIENT
Start: 2018-11-13 | End: 2018-11-13

## 2018-11-13 RX ORDER — PHENYLEPHRINE HYDROCHLORIDE 100 MG/ML
1 SOLUTION/ DROPS OPHTHALMIC
Status: COMPLETED | OUTPATIENT
Start: 2018-11-13 | End: 2018-11-13

## 2018-11-13 RX ORDER — LEVOBUNOLOL HYDROCHLORIDE 5 MG/ML
SOLUTION/ DROPS OPHTHALMIC PRN
Status: DISCONTINUED | OUTPATIENT
Start: 2018-11-13 | End: 2018-11-13 | Stop reason: HOSPADM

## 2018-11-13 RX ORDER — TETRACAINE HYDROCHLORIDE 5 MG/ML
SOLUTION OPHTHALMIC PRN
Status: DISCONTINUED | OUTPATIENT
Start: 2018-11-13 | End: 2018-11-13 | Stop reason: HOSPADM

## 2018-11-13 RX ORDER — PILOCARPINE HYDROCHLORIDE 10 MG/ML
SOLUTION/ DROPS OPHTHALMIC PRN
Status: DISCONTINUED | OUTPATIENT
Start: 2018-11-13 | End: 2018-11-13 | Stop reason: HOSPADM

## 2018-11-13 RX ORDER — LIDOCAINE HYDROCHLORIDE 10 MG/ML
INJECTION, SOLUTION EPIDURAL; INFILTRATION; INTRACAUDAL; PERINEURAL PRN
Status: DISCONTINUED | OUTPATIENT
Start: 2018-11-13 | End: 2018-11-13 | Stop reason: HOSPADM

## 2018-11-13 RX ORDER — MOXIFLOXACIN 5 MG/ML
SOLUTION/ DROPS OPHTHALMIC PRN
Status: DISCONTINUED | OUTPATIENT
Start: 2018-11-13 | End: 2018-11-13 | Stop reason: HOSPADM

## 2018-11-13 RX ORDER — PROPARACAINE HYDROCHLORIDE 5 MG/ML
1 SOLUTION/ DROPS OPHTHALMIC ONCE
Status: COMPLETED | OUTPATIENT
Start: 2018-11-13 | End: 2018-11-13

## 2018-11-13 RX ORDER — PHENYLEPHRINE HYDROCHLORIDE 25 MG/ML
1 SOLUTION/ DROPS OPHTHALMIC
Status: COMPLETED | OUTPATIENT
Start: 2018-11-13 | End: 2018-11-13

## 2018-11-13 RX ADMIN — PROPARACAINE HYDROCHLORIDE 1 DROP: 5 SOLUTION/ DROPS OPHTHALMIC at 14:01

## 2018-11-13 RX ADMIN — PHENYLEPHRINE HYDROCHLORIDE 1 DROP: 100 SOLUTION/ DROPS OPHTHALMIC at 14:25

## 2018-11-13 RX ADMIN — PHENYLEPHRINE HYDROCHLORIDE 1 DROP: 25 SOLUTION/ DROPS OPHTHALMIC at 14:07

## 2018-11-13 RX ADMIN — CYCLOPENTOLATE HYDROCHLORIDE 1 DROP: 20 SOLUTION/ DROPS OPHTHALMIC at 14:07

## 2018-11-13 RX ADMIN — MOXIFLOXACIN 0.5 ML: 5 SOLUTION/ DROPS OPHTHALMIC at 14:09

## 2018-11-13 RX ADMIN — ACETAMINOPHEN 650 MG: 325 TABLET, FILM COATED ORAL at 14:01

## 2018-11-13 RX ADMIN — CYCLOPENTOLATE HYDROCHLORIDE 1 DROP: 20 SOLUTION/ DROPS OPHTHALMIC at 14:00

## 2018-11-13 RX ADMIN — PHENYLEPHRINE HYDROCHLORIDE 1 DROP: 25 SOLUTION/ DROPS OPHTHALMIC at 14:00

## 2018-11-13 ASSESSMENT — COPD QUESTIONNAIRES: CAT_SEVERITY: MODERATE

## 2018-11-13 NOTE — ANESTHESIA CARE TRANSFER NOTE
Patient: Serafin Velasquez    Procedure(s):  PHACOEMULSIFICATION CATARACT EXTRACTION POSTERIOR CHAMBER LENS LEFT    Diagnosis: COMBINED FORMS OF AGE RELATED CATARACT LEFT  Diagnosis Additional Information: No value filed.    Anesthesia Type:   MAC     Note:  Airway :Nasal Cannula  Patient transferred to:Phase II  Handoff Report: Identifed the Patient, Identified the Reponsible Provider, Reviewed the pertinent medical history, Discussed the surgical course, Reviewed Intra-OP anesthesia mangement and issues during anesthesia, Set expectations for post-procedure period and Allowed opportunity for questions and acknowledgement of understanding      Vitals: (Last set prior to Anesthesia Care Transfer)    CRNA VITALS  11/13/2018 1455 - 11/13/2018 1526      11/13/2018             Pulse: 70    Ht Rate: 72    SpO2: 100 %    Resp Rate (set): 8                Electronically Signed By: ALLA Menjivar CRNA  November 13, 2018  3:26 PM

## 2018-11-13 NOTE — OP NOTE
[facility]  Ophthalmology Full Operative Note    Pre-operative diagnosis: COMBINED FORMS OF AGE RELATED CATARACT LEFT   Post-operative diagnosis Same   Procedure: Procedure(s):  PHACOEMULSIFICATION CATARACT EXTRACTION POSTERIOR CHAMBER LENS LEFT   Surgeon: Segundo Power MD   Assistants(s):    Anesthesia: Combined MAC with Topical    Estimated blood loss: None    Total IV fluids: (See anesthesia record)   Specimens: None   Implants: 11.5 LI61AO   Findings:    Complications: None   Condition: Stable   Comments:       PROCEDURAL ANESTHESIA:     Topical/MAC.  Lidocaine 2% jelly topically and Lidocaine 1% preservative-free intracamerally.     PROCEDURE:  The patient was brought to the Operating Room and prepped and draped in a sterile manner.  A wire lid speculum was placed.  A paracentesis was created and 1% Lidocaine was instilled in the anterior chamber.  The anterior chamber was then filled with Amvisc viscoelastic.  A clear cornea temporal wound was created using a 2.8 mm keratome.  A cystotome was used to initiate a flap in the anterior capsule and a Utrata forceps was used to create a continuous tear capsulorhexis.  Hydrodissection was performed.  The phacoemulsification tip was inserted into the eye and the nucleus and epinucleus were removed using a divide and conquer technique.  The residual cortex was removed using the I/A handpiece.  The anterior chamber was then refilled with viscoelastic and the wound was enlarged with the keratome.  The intraocular lens, 11.5 diopter, Model LI61AO, was placed into the injector and injected into the capsular bag. It was checked to make sure that it was central and stable.  Residual viscoelastic was removed using the I/A.  The anterior chamber was refilled with BSS.  The wounds were hydrated with BSS and were noted to be watertight with no suture necessary.  Topical pilocarpine 1%, Betagan, and Vigamox was applied.  A hard shield was placed.     The patient  tolerated the procedure well and was sent to the Recovery Room in satisfactory condition.

## 2018-11-13 NOTE — OR NURSING
Took juice and cookies w/o nausea.Denies pain.Patient and responsible adult given discharge instructions with no questions regarding instructions. Zabrina score 20. Pain level 0/10.  Discharged from unit via w/c. Patient discharged to home.

## 2018-11-13 NOTE — INTERVAL H&P NOTE
History and physical reviewed. Patient examined. No interval change in condition.  Segundo Power

## 2018-11-13 NOTE — IP AVS SNAPSHOT
[facility] Preop/Phase II    [address] [address]    Phone:  [phone]                                       After Visit Summary   11/13/2018    Serafin Velasquez    MRN: [id number]           After Visit Summary Signature Page     I have received my discharge instructions, and my questions have been answered. I have discussed any challenges I see with this plan with the nurse or doctor.    ..........................................................................................................................................  Patient/Patient Representative Signature      ..........................................................................................................................................  Patient Representative Print Name and Relationship to Patient    ..................................................               ................................................  Date                                   Time    ..........................................................................................................................................  Reviewed by Signature/Title    ...................................................              ..............................................  Date                                               Time          22EPIC Rev 08/18

## 2018-11-13 NOTE — IP AVS SNAPSHOT
"                  MRN:[id number]                      After Visit Summary   11/13/2018    Serafin Velasquez    MRN: [id number]           Thank you!     Thank you for choosing [facility] for your care. Our goal is always to provide you with excellent care. Hearing back from our patients is one way we can continue to improve our services. Please take a few minutes to complete the written survey that you may receive in the mail after you visit with us. Thank you!        Patient Information     Date Of Birth          1950        About your hospital stay     You were admitted on:  November 13, 2018 You last received care in the:  [facility] Preop/Phase II    You were discharged on:  November 13, 2018       Who to Call     For medical emergencies, please call 911.  For non-urgent questions about your medical care, please call your primary care provider or clinic, [phone]  For questions related to your surgery, please call your surgery clinic        Attending Provider     Provider Specialty    Segundo Power MD Ophthalmology       Primary Care Provider Office Phone # Fax #    Mahendra Fermin [phone]      Further instructions from your care team             Pending Results     No orders found from 11/11/2018 to 11/14/2018.            Admission Information     Date & Time Provider Department Dept. Phone    11/13/2018 Segundo Power MD [facility] Preop/Phase [phone]      Your Vitals Were     Blood Pressure Temperature Respirations Height Weight Pulse Oximetry    120/60 96.8  F (36  C) (Oral) 18 1.88 m (6' 2\") 176.9 kg (390 lb) 98%    BMI (Body Mass Index)                   50.07 kg/m2           [facility] Information     [facility] lets you send messages to your doctor, view your test results, renew your prescriptions, schedule appointments and more. To sign up, go to [email or web address] . Click on \"Log in\" on the left side of the screen, which will take you to the Welcome page. Then click on \"Sign up " "Now\" on the right side of the page.     You will be asked to enter the access code listed below, as well as some personal information. Please follow the directions to create your username and password.     Your access code is: [email or web address]  Expires: 2018  7:38 PM     Your access code will  in 90 days. If you need help or a new code, please call your [place] clinic or [phone].        Care EveryWhere ID     This is your Care EveryWhere ID. This could be used by other organizations to access your [place] medical records  [id number]        Equal Access to Services     [facility]: Hadmena Dumont, randy payan, siav carr, reema santillan . So [facility] [phone].    ATENCIÓN: Si habla español, tiene a boyd disposición servicios gratuitos de asistencia lingüística. Llame al [phone].    We comply with applicable federal civil rights laws and [place] laws. We do not discriminate on the basis of race, color, national origin, age, disability, sex, sexual orientation, or gender identity.               Review of your medicines      UNREVIEWED medicines. Ask your doctor about these medicines        Dose / Directions    albuterol 108 (90 Base) MCG/ACT inhaler   Commonly known as:  PROAIR HFA/PROVENTIL HFA/VENTOLIN HFA        Dose:  1 puff   Inhale 1 puff into the lungs every 4 hours as needed for shortness of breath / dyspnea or wheezing   Refills:  0       ASPIRIN PO        Dose:  81 mg   Take 81 mg by mouth daily   Refills:  0       BREO ELLIPTA 200-25 MCG/INH inhaler   Generic drug:  fluticasone-vilanterol        Dose:  1 puff   Inhale 1 puff into the lungs daily   Refills:  0       calcium acetate 667 MG Caps capsule   Commonly known as:  PHOSLO        Dose:  667 mg   Take 667 mg by mouth 3 times daily (with meals)   Refills:  0       calcium carbonate 600 mg-vitamin D 400 units 600-400 MG-UNIT per tablet   Commonly known as:  CALTRATE        " Dose:  1 tablet   Take 1 tablet by mouth 2 times daily   Refills:  0       fish oil-omega-3 fatty acids 1000 MG capsule        Dose:  1 g   Take 1 g by mouth 2 times daily   Refills:  0       GLUCOSAMINE SULFATE PO        Dose:  1500 mg   Take 1,500 mg by mouth daily   Refills:  0       IMDUR 30 MG 24 hr tablet   Generic drug:  isosorbide mononitrate        Dose:  30 mg   Take 30 mg by mouth daily.   Refills:  0       ipratropium - albuterol 0.5 mg/2.5 mg/3 mL 0.5-2.5 (3) MG/3ML neb solution   Commonly known as:  DUONEB        Dose:  1 vial   Take 1 vial by nebulization 4 times daily   Refills:  0       LASIX PO        Dose:  160 mg   Take 160 mg by mouth   Refills:  0       LIPITOR 20 MG tablet   Generic drug:  atorvastatin        Dose:  20 mg   Take 20 mg by mouth daily.   Refills:  0       LOPRESSOR PO        Dose:  25 mg   Take 25 mg by mouth   Refills:  0       multivitamin per tablet        Dose:  1 tablet   Take 1 tablet by mouth daily.   Refills:  0       PEPCID PO        Dose:  20 mg   Take 20 mg by mouth 2 times daily   Refills:  0       PREDNISONE PO        Dose:  5 mg   Take 5 mg by mouth daily   Refills:  0       TYLENOL PO        Dose:  325 mg   Take 325 mg by mouth every 6 hours as needed for mild pain or fever   Refills:  0       UNABLE TO FIND        MEDICATION NAME: vitamin d3 2,000 units daily   Refills:  0       VITAMIN C PO        Dose:  500 mg   Take 500 mg by mouth daily   Refills:  0       ZOLOFT PO        Dose:  100 mg   Take 100 mg by mouth 2 times daily   Refills:  0                Protect others around you: Learn how to safely use, store and throw away your medicines at www.disposemymeds.org.             Medication List: This is a list of all your medications and when to take them. Check marks below indicate your daily home schedule. Keep this list as a reference.      Medications           Morning Afternoon Evening Bedtime As Needed    albuterol 108 (90 Base) MCG/ACT inhaler   Commonly  known as:  PROAIR HFA/PROVENTIL HFA/VENTOLIN HFA   Inhale 1 puff into the lungs every 4 hours as needed for shortness of breath / dyspnea or wheezing                                ASPIRIN PO   Take 81 mg by mouth daily                                BREO ELLIPTA 200-25 MCG/INH inhaler   Inhale 1 puff into the lungs daily   Generic drug:  fluticasone-vilanterol                                calcium acetate 667 MG Caps capsule   Commonly known as:  PHOSLO   Take 667 mg by mouth 3 times daily (with meals)                                calcium carbonate 600 mg-vitamin D 400 units 600-400 MG-UNIT per tablet   Commonly known as:  CALTRATE   Take 1 tablet by mouth 2 times daily                                fish oil-omega-3 fatty acids 1000 MG capsule   Take 1 g by mouth 2 times daily                                GLUCOSAMINE SULFATE PO   Take 1,500 mg by mouth daily                                IMDUR 30 MG 24 hr tablet   Take 30 mg by mouth daily.   Generic drug:  isosorbide mononitrate                                ipratropium - albuterol 0.5 mg/2.5 mg/3 mL 0.5-2.5 (3) MG/3ML neb solution   Commonly known as:  DUONEB   Take 1 vial by nebulization 4 times daily                                LASIX PO   Take 160 mg by mouth                                LIPITOR 20 MG tablet   Take 20 mg by mouth daily.   Generic drug:  atorvastatin                                LOPRESSOR PO   Take 25 mg by mouth                                multivitamin per tablet   Take 1 tablet by mouth daily.                                PEPCID PO   Take 20 mg by mouth 2 times daily                                PREDNISONE PO   Take 5 mg by mouth daily                                TYLENOL PO   Take 325 mg by mouth every 6 hours as needed for mild pain or fever   Last time this was given:  650 mg on 11/13/2018  2:01 PM                                UNABLE TO FIND   MEDICATION NAME: vitamin d3 2,000 units daily                                 VITAMIN C PO   Take 500 mg by mouth daily                                ZOLOFT PO   Take 100 mg by mouth 2 times daily

## 2018-11-14 NOTE — ANESTHESIA POSTPROCEDURE EVALUATION
Patient: Serafin Velasquez    Procedure(s):  PHACOEMULSIFICATION CATARACT EXTRACTION POSTERIOR CHAMBER LENS LEFT    Diagnosis:COMBINED FORMS OF AGE RELATED CATARACT LEFT  Diagnosis Additional Information: No value filed.    Anesthesia Type:  MAC    Note:  Anesthesia Post Evaluation    Patient location during evaluation: Phase 2 and Bedside  Patient participation: Able to fully participate in evaluation  Level of consciousness: awake and alert  Pain management: adequate  Airway patency: patent  Cardiovascular status: acceptable  Respiratory status: acceptable  Hydration status: stable  PONV: none     Anesthetic complications: None          Last vitals:  Vitals:    11/13/18 1530 11/13/18 1535 11/13/18 1540   BP: 109/51 120/60 120/58   Resp: 16 18 18   Temp:   97.7  F (36.5  C)   SpO2: 99% 98% 98%         Electronically Signed By: Wesley Jones MD  November 14, 2018  10:15 AM

## 2019-05-07 ENCOUNTER — APPOINTMENT (OUTPATIENT)
Dept: GENERAL RADIOLOGY | Facility: HOSPITAL | Age: 69
End: 2019-05-07
Attending: PHYSICIAN ASSISTANT
Payer: MEDICARE

## 2019-05-07 ENCOUNTER — HOSPITAL ENCOUNTER (EMERGENCY)
Facility: HOSPITAL | Age: 69
Discharge: ANOTHER HOSPITAL | End: 2019-05-07
Attending: PHYSICIAN ASSISTANT | Admitting: PHYSICIAN ASSISTANT
Payer: MEDICARE

## 2019-05-07 VITALS
SYSTOLIC BLOOD PRESSURE: 150 MMHG | DIASTOLIC BLOOD PRESSURE: 97 MMHG | HEART RATE: 83 BPM | OXYGEN SATURATION: 97 % | TEMPERATURE: 98.6 F | RESPIRATION RATE: 22 BRPM

## 2019-05-07 DIAGNOSIS — L03.116 CELLULITIS OF LEFT LEG: ICD-10-CM

## 2019-05-07 DIAGNOSIS — R53.1 GENERAL WEAKNESS: ICD-10-CM

## 2019-05-07 DIAGNOSIS — R06.02 SOB (SHORTNESS OF BREATH): ICD-10-CM

## 2019-05-07 DIAGNOSIS — E11.8 TYPE 2 DIABETES MELLITUS WITH COMPLICATION, WITHOUT LONG-TERM CURRENT USE OF INSULIN (H): ICD-10-CM

## 2019-05-07 DIAGNOSIS — E11.8 DM (DIABETES MELLITUS) WITH COMPLICATIONS (H): ICD-10-CM

## 2019-05-07 DIAGNOSIS — J44.9 CHRONIC OBSTRUCTIVE PULMONARY DISEASE, UNSPECIFIED COPD TYPE (H): ICD-10-CM

## 2019-05-07 DIAGNOSIS — N18.9 CHRONIC KIDNEY DISEASE, UNSPECIFIED CKD STAGE: ICD-10-CM

## 2019-05-07 DIAGNOSIS — S91.311A FOOT LACERATION, RIGHT, INITIAL ENCOUNTER: ICD-10-CM

## 2019-05-07 DIAGNOSIS — N18.9 CHRONIC KIDNEY DISEASE: ICD-10-CM

## 2019-05-07 LAB
ANION GAP SERPL CALCULATED.3IONS-SCNC: 13 MMOL/L (ref 3–14)
BASOPHILS # BLD AUTO: 0 10E9/L (ref 0–0.2)
BASOPHILS NFR BLD AUTO: 0.2 %
BUN SERPL-MCNC: 53 MG/DL (ref 7–30)
CALCIUM SERPL-MCNC: 8 MG/DL (ref 8.5–10.1)
CHLORIDE SERPL-SCNC: 103 MMOL/L (ref 94–109)
CK SERPL-CCNC: 332 U/L (ref 30–300)
CO2 SERPL-SCNC: 25 MMOL/L (ref 20–32)
CREAT SERPL-MCNC: 9.56 MG/DL (ref 0.66–1.25)
DIFFERENTIAL METHOD BLD: ABNORMAL
EOSINOPHIL # BLD AUTO: 0.1 10E9/L (ref 0–0.7)
EOSINOPHIL NFR BLD AUTO: 0.4 %
ERYTHROCYTE [DISTWIDTH] IN BLOOD BY AUTOMATED COUNT: 16.3 % (ref 10–15)
GFR SERPL CREATININE-BSD FRML MDRD: 5 ML/MIN/{1.73_M2}
GLUCOSE SERPL-MCNC: 126 MG/DL (ref 70–99)
HCT VFR BLD AUTO: 34.1 % (ref 40–53)
HGB BLD-MCNC: 11 G/DL (ref 13.3–17.7)
IMM GRANULOCYTES # BLD: 0.4 10E9/L (ref 0–0.4)
IMM GRANULOCYTES NFR BLD: 2.1 %
LACTATE BLD-SCNC: 2.9 MMOL/L (ref 0.7–2)
LACTATE BLD-SCNC: 3 MMOL/L (ref 0.7–2)
LYMPHOCYTES # BLD AUTO: 0.9 10E9/L (ref 0.8–5.3)
LYMPHOCYTES NFR BLD AUTO: 4.7 %
MCH RBC QN AUTO: 32.2 PG (ref 26.5–33)
MCHC RBC AUTO-ENTMCNC: 32.3 G/DL (ref 31.5–36.5)
MCV RBC AUTO: 100 FL (ref 78–100)
MONOCYTES # BLD AUTO: 0.9 10E9/L (ref 0–1.3)
MONOCYTES NFR BLD AUTO: 4.7 %
NEUTROPHILS # BLD AUTO: 16.8 10E9/L (ref 1.6–8.3)
NEUTROPHILS NFR BLD AUTO: 87.9 %
NRBC # BLD AUTO: 0 10*3/UL
NRBC BLD AUTO-RTO: 0 /100
NT-PROBNP SERPL-MCNC: ABNORMAL PG/ML (ref 0–900)
PLATELET # BLD AUTO: 169 10E9/L (ref 150–450)
POTASSIUM SERPL-SCNC: 3.1 MMOL/L (ref 3.4–5.3)
PROCALCITONIN SERPL-MCNC: 1.87 NG/ML
RBC # BLD AUTO: 3.42 10E12/L (ref 4.4–5.9)
SODIUM SERPL-SCNC: 141 MMOL/L (ref 133–144)
WBC # BLD AUTO: 19.1 10E9/L (ref 4–11)

## 2019-05-07 PROCEDURE — 87077 CULTURE AEROBIC IDENTIFY: CPT | Performed by: PHYSICIAN ASSISTANT

## 2019-05-07 PROCEDURE — 25000128 H RX IP 250 OP 636: Performed by: PHYSICIAN ASSISTANT

## 2019-05-07 PROCEDURE — 96365 THER/PROPH/DIAG IV INF INIT: CPT

## 2019-05-07 PROCEDURE — 87070 CULTURE OTHR SPECIMN AEROBIC: CPT | Mod: 59 | Performed by: PHYSICIAN ASSISTANT

## 2019-05-07 PROCEDURE — 36415 COLL VENOUS BLD VENIPUNCTURE: CPT | Performed by: PHYSICIAN ASSISTANT

## 2019-05-07 PROCEDURE — 87040 BLOOD CULTURE FOR BACTERIA: CPT | Performed by: PHYSICIAN ASSISTANT

## 2019-05-07 PROCEDURE — 71046 X-RAY EXAM CHEST 2 VIEWS: CPT | Mod: TC

## 2019-05-07 PROCEDURE — 82550 ASSAY OF CK (CPK): CPT | Performed by: PHYSICIAN ASSISTANT

## 2019-05-07 PROCEDURE — 87186 SC STD MICRODIL/AGAR DIL: CPT | Performed by: PHYSICIAN ASSISTANT

## 2019-05-07 PROCEDURE — 80048 BASIC METABOLIC PNL TOTAL CA: CPT | Performed by: PHYSICIAN ASSISTANT

## 2019-05-07 PROCEDURE — 73630 X-RAY EXAM OF FOOT: CPT | Mod: TC,RT

## 2019-05-07 PROCEDURE — 25800030 ZZH RX IP 258 OP 636: Performed by: PHYSICIAN ASSISTANT

## 2019-05-07 PROCEDURE — 85025 COMPLETE CBC W/AUTO DIFF WBC: CPT | Performed by: PHYSICIAN ASSISTANT

## 2019-05-07 PROCEDURE — 99285 EMERGENCY DEPT VISIT HI MDM: CPT | Mod: Z6 | Performed by: PHYSICIAN ASSISTANT

## 2019-05-07 PROCEDURE — 83605 ASSAY OF LACTIC ACID: CPT | Performed by: PHYSICIAN ASSISTANT

## 2019-05-07 PROCEDURE — 99285 EMERGENCY DEPT VISIT HI MDM: CPT | Mod: 25

## 2019-05-07 PROCEDURE — 83880 ASSAY OF NATRIURETIC PEPTIDE: CPT | Performed by: PHYSICIAN ASSISTANT

## 2019-05-07 PROCEDURE — 84145 PROCALCITONIN (PCT): CPT | Performed by: PHYSICIAN ASSISTANT

## 2019-05-07 RX ORDER — CEFTRIAXONE SODIUM 1 G/50ML
1 INJECTION, SOLUTION INTRAVENOUS ONCE
Status: COMPLETED | OUTPATIENT
Start: 2019-05-07 | End: 2019-05-07

## 2019-05-07 RX ORDER — SEVELAMER CARBONATE 800 MG/1
3200 TABLET, FILM COATED ORAL
COMMUNITY

## 2019-05-07 RX ORDER — SODIUM CHLORIDE 9 MG/ML
INJECTION, SOLUTION INTRAVENOUS CONTINUOUS
Status: DISCONTINUED | OUTPATIENT
Start: 2019-05-07 | End: 2019-05-07 | Stop reason: HOSPADM

## 2019-05-07 RX ADMIN — SODIUM CHLORIDE: 9 INJECTION, SOLUTION INTRAVENOUS at 12:29

## 2019-05-07 RX ADMIN — CEFTRIAXONE SODIUM 1 G: 1 INJECTION, SOLUTION INTRAVENOUS at 12:33

## 2019-05-07 ASSESSMENT — ENCOUNTER SYMPTOMS
NECK STIFFNESS: 0
ARTHRALGIAS: 0
FATIGUE: 1
COLOR CHANGE: 1
DIFFICULTY URINATING: 0
ABDOMINAL PAIN: 0
CHILLS: 1
FEVER: 1
HEADACHES: 0
CONFUSION: 0
SHORTNESS OF BREATH: 1
EYE REDNESS: 0

## 2019-05-07 NOTE — ED NOTES
Critical result of  Lactic Acid value of 3.0  Reported to  Franklin Trom  Time given to provider  11:06 AM  Lucia Gutierrez RN 11:06 AM 5/7/2019

## 2019-05-07 NOTE — ED TRIAGE NOTES
Pt presents with c/o weakness, Pt was getting back into bed this morning, missed the bed and ended up kneeling on the floor next to his bed for 5 hours.

## 2019-05-07 NOTE — ED NOTES
DATE:  5/7/2019   TIME OF RECEIPT FROM LAB:  1300  LAB TEST:  lactic  LAB VALUE:  2.9  RESULTS GIVEN WITH READ-BACK TO (PROVIDER):  1300  TIME LAB VALUE REPORTED TO PROVIDER:   1300

## 2019-05-07 NOTE — ED PROVIDER NOTES
History     Chief Complaint   Patient presents with     Generalized Weakness     HPI  Serafin Velasquez is a 68 year old male who presents to emergency department by amublance with complaints of generalized weakness, fever at home, increased erythema and tenderness of the lower legs.  Patient had a fall at home on Saturday night in which he fell down a spiral staircase.  He had laceration of the skin between the fourth and fifth toes on the right foot as a result.  Patient is currently on peritoneal dialysis as result of chronic kidney disease.  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  Last night the patient attempted to get out of bed to the bathroom but became increasingly weak and fell and was subsequently on the floor for 5 hours.      His wife states that he is very unsteady on his feet ever since the myocardial infarction.  He has a history of chronic lower extremity edema but has had bleeding from the right leg since last night.patient denies loss of consciousness or concerns of any fractures as result of the fall from Friday night.    Allergies:  Allergies   Allergen Reactions     Diagnostic X-Ray Materials Other (See Comments) and Anaphylaxis     Gadallinium: patient stated he went into cardiac arrest.  Had in 2011 in [place].       Lisinopril Swelling       Problem List:    Patient Active Problem List    Diagnosis Date Noted     Diabetes (H)      Priority: Medium     Chronic kidney disease      Priority: Medium     Sebaceous cyst 05/07/2013     Priority: Medium     DM (diabetes mellitus) with complications (H) 04/30/2013     Priority: Medium     KRISTIAN (obstructive sleep apnea) 04/30/2013     Priority: Medium     Obese abdomen 04/30/2013     Priority: Medium     Depression 04/30/2013     Priority: Medium     Lymphedema of lower extremity 04/30/2013     Priority: Medium     Venous stasis      Priority: Medium     GERD (gastroesophageal reflux disease)      Priority: Medium     Persistent  proteinuria      Priority: Medium     Phlebitis or thrombophlebitis of lower extremity      Priority: Medium     Problem list name updated by automated process. Provider to review          Past Medical History:    Past Medical History:   Diagnosis Date     Abnormal glucose 2001     Acute MI (H) 1999     Cardiovascular disease 1999     Cellulitis of leg without foot 2001     Chronic kidney disease      Coronary artery disease      Diabetes (H)      Difficult intubation      Dysphagia 2001     Epistaxis 2004     GERD (gastroesophageal reflux disease) 2008     HTN (hypertension) 2004     Hyperchylomicronaemia 2004     Hyperlipidemia 2003     Osteoarthritis 2002     Other nonspecific finding on examination of urine 2001     Peptic disease 2004     Persistent proteinuria 2008     Phlebitis and thrombophlebitis of lower extremities, unspecified 2001     Sleep apnea      Undiagnosed cardiac murmurs 2007     Unspecified septicemia(038.9) (H) 2001     Venous stasis 2008       Past Surgical History:    Past Surgical History:   Procedure Laterality Date     EXCISE CYST GENERIC (LOCATION)  5/2/2013    Procedure: EXCISE CYST GENERIC (LOCATION);  EXCISION OF SEBACEOUS CYST, MID BACK x 2  ;  Surgeon: Jaida Gaffney DO;  Location: [facility]     ORTHOPEDIC SURGERY      bilateral knee arthroscopy     PHACOEMULSIFICATION WITH STANDARD INTRAOCULAR LENS IMPLANT Right 10/22/2018    Procedure: PHACOEMULSIFICATION CATARACT EXTRACTION POSTERIOR CHAMBER LENS RIGHT;  Surgeon: Segundo Power MD;  Location: [facility]     PHACOEMULSIFICATION WITH STANDARD INTRAOCULAR LENS IMPLANT Left 11/13/2018    Procedure: PHACOEMULSIFICATION CATARACT EXTRACTION POSTERIOR CHAMBER LENS LEFT;  Surgeon: Segundo Power MD;  Location: [facility]       Family History:    Family History   Problem Relation Age of Onset     C.A.D. Father      Diabetes Father      Hypertension Father        Social History:  Marital Status:   [2]  Social History     Tobacco Use      Smoking status: Current Every Day Smoker     Packs/day: 0.12     Smokeless tobacco: Current User     Types: Chew   Substance Use Topics     Alcohol use: No     Drug use: No        Medications:      Acetaminophen (TYLENOL PO)   albuterol (PROAIR HFA/PROVENTIL HFA/VENTOLIN HFA) 108 (90 Base) MCG/ACT inhaler   Ascorbic Acid (VITAMIN C PO)   ASPIRIN PO   atorvastatin (LIPITOR) 20 MG tablet   calcium acetate (PHOSLO) 667 MG CAPS capsule   calcium carbonate 600 mg-vitamin D 400 units (CALTRATE) 600-400 MG-UNIT per tablet   Famotidine (PEPCID PO)   fish oil-omega-3 fatty acids (FISH OIL) 1000 MG capsule   fluticasone-vilanterol (BREO ELLIPTA) 200-25 MCG/INH inhaler   GLUCOSAMINE SULFATE PO   isosorbide mononitrate (IMDUR) 30 MG 24 hr tablet   Multiple Vitamin (MULTIVITAMIN) per tablet   PREDNISONE PO   Sertraline HCl (ZOLOFT PO)   sevelamer (RENVELA) 800 MG tablet   UNABLE TO FIND   ipratropium - albuterol 0.5 mg/2.5 mg/3 mL (DUONEB) 0.5-2.5 (3) MG/3ML neb solution         Review of Systems   Constitutional: Positive for chills, fatigue and fever.   HENT: Negative for congestion.    Eyes: Negative for redness.   Respiratory: Positive for shortness of breath.    Cardiovascular: Negative for chest pain.   Gastrointestinal: Negative for abdominal pain.   Genitourinary: Negative for difficulty urinating.   Musculoskeletal: Negative for arthralgias and neck stiffness.   Skin: Positive for color change.   Neurological: Negative for headaches.   Psychiatric/Behavioral: Negative for confusion.       Physical Exam   BP: 107/95  Pulse: 84  Temp: 99.2  F (37.3  C)  Resp: 22  SpO2: 97 %      Physical Exam   Constitutional: No distress.   HENT:   Head: Atraumatic.   Mouth/Throat: Oropharynx is clear and moist.   Eyes: Pupils are equal, round, and reactive to light. EOM are normal. No scleral icterus.   Cardiovascular: Normal heart sounds and intact distal pulses.   Pulmonary/Chest: Breath sounds normal. Tachypnea noted. No respiratory  distress.   Abdominal: Soft. Bowel sounds are normal. There is no tenderness.   Musculoskeletal: He exhibits no edema or tenderness.   Skin: Skin is warm. He is not diaphoretic.   Bilateral lower extremities are edematous with chronic crusted fluid on the legs and mild bleeding from the right lower leg.  There is a laceration between the fourth and fifth toes on right foot.  The toenail of the third digit of the right foot is partially torn.    The left leg is moderately erythematous with small blisters on the edematous leg.  Patient's wife states this is unusually red for him and concerning for infection.   Nursing note and vitals reviewed.      ED Course        Initially begin treatment as possible sepsis given fever at home.  White count was elevated at 19.1 and lactate was 3.0.  Patient was started on IV fluids 150mL of normal saline per hour and 1 g Rocephin IV was started for the patient.  Vitals remain stable during the course of the emergency department stay.       Results for orders placed or performed during the hospital encounter of 05/07/19 (from the past 24 hour(s))   CBC with platelets differential   Result Value Ref Range    WBC 19.1 (H) 4.0 - 11.0 10e9/L    RBC Count 3.42 (L) 4.4 - 5.9 10e12/L    Hemoglobin 11.0 (L) 13.3 - 17.7 g/dL    Hematocrit 34.1 (L) 40.0 - 53.0 %     78 - 100 fl    MCH 32.2 26.5 - 33.0 pg    MCHC 32.3 31.5 - 36.5 g/dL    RDW 16.3 (H) 10.0 - 15.0 %    Platelet Count 169 150 - 450 10e9/L    Diff Method Automated Method     % Neutrophils 87.9 %    % Lymphocytes 4.7 %    % Monocytes 4.7 %    % Eosinophils 0.4 %    % Basophils 0.2 %    % Immature Granulocytes 2.1 %    Nucleated RBCs 0 0 /100    Absolute Neutrophil 16.8 (H) 1.6 - 8.3 10e9/L    Absolute Lymphocytes 0.9 0.8 - 5.3 10e9/L    Absolute Monocytes 0.9 0.0 - 1.3 10e9/L    Absolute Eosinophils 0.1 0.0 - 0.7 10e9/L    Absolute Basophils 0.0 0.0 - 0.2 10e9/L    Abs Immature Granulocytes 0.4 0 - 0.4 10e9/L    Absolute  Nucleated RBC 0.0    Basic metabolic panel   Result Value Ref Range    Sodium 141 133 - 144 mmol/L    Potassium 3.1 (L) 3.4 - 5.3 mmol/L    Chloride 103 94 - 109 mmol/L    Carbon Dioxide 25 20 - 32 mmol/L    Anion Gap 13 3 - 14 mmol/L    Glucose 126 (H) 70 - 99 mg/dL    Urea Nitrogen 53 (H) 7 - 30 mg/dL    Creatinine 9.56 (H) 0.66 - 1.25 mg/dL    GFR Estimate 5 (L) >60 mL/min/[1.73_m2]    GFR Estimate If Black 6 (L) >60 mL/min/[1.73_m2]    Calcium 8.0 (L) 8.5 - 10.1 mg/dL   Nt probnp inpatient (BNP)   Result Value Ref Range    N-Terminal Pro BNP Inpatient 11,095 (H) 0 - 900 pg/mL   Lactic acid whole blood   Result Value Ref Range    Lactic Acid 3.0 (H) 0.7 - 2.0 mmol/L   CK total   Result Value Ref Range    CK Total 332 (H) 30 - 300 U/L   XR Chest 2 Views    Narrative    PROCEDURE:  XR CHEST 2 VW    HISTORY:  sob, gen weakness.     COMPARISON:  September 23, 2018    FINDINGS:   The cardiac silhouette is normal in size. The pulmonary vasculature is  normal.  The lungs are clear. No pleural effusion or pneumothorax.      Impression    IMPRESSION:  No acute cardiopulmonary disease.      YONATAN JIMENEZ MD   Foot  XR, G/E 3 views, right    Narrative    PROCEDURE:  XR FOOT RT G/E 3 VW    HISTORY: right little toe, possible fracture, laceration    COMPARISON:  None.    TECHNIQUE:  3 views of the right foot were obtained.    FINDINGS:  There is a bunion deformity. No acute fractures or  destructive lesions are noted. Heavy vascular calcifications are  noted. There is bony spurring at the insertion of the plantar fascia  into the calcaneus.       Impression    IMPRESSION: No acute fracture.      YONATAN JIMENEZ MD   Lactic acid whole blood   Result Value Ref Range    Lactic Acid 2.9 (H) 0.7 - 2.0 mmol/L       Medications   sodium chloride 0.9% infusion ( Intravenous New Bag 5/7/19 1229)   cefTRIAXone in d5w (ROCEPHIN) intermittent infusion 1 g (0 g Intravenous Stopped 5/7/19 1305)       Assessments & Plan (with Medical  Decision Making)     I have reviewed the nursing notes.    I have reviewed the findings, diagnosis, plan and need for follow up with the patient.  Discussed patient with the hospitalist at [facility] and he is not a candidate for admission here due to peritoneal dialysis.  Due to weakness and patient's inability to transfer adequately with associated cellulitis, diabetes and chronic kidney disease, arranged transfer of the patient to [facility] where he has previously been hospitalized.  Blood and wound cultures pending.         Medication List      There are no discharge medications for this visit.         Final diagnoses:   General weakness   SOB (shortness of breath)   Chronic obstructive pulmonary disease, unspecified COPD type (H)   Type 2 diabetes mellitus with complication, without long-term current use of insulin (H)   Chronic kidney disease, unspecified CKD stage   Cellulitis of left leg   Foot laceration, right, initial encounter       CARMINA Townsend on 5/9/2019 at 7:32 AM   5/7/2019   [facility] EM[facility]     Maulik Rivero PA  05/09/19 0733

## 2019-05-08 NOTE — RESULT ENCOUNTER NOTE
Patient transferred to [facility]. Called and spoke with Abraham regarding results and results faxed to [phone].

## 2019-05-09 NOTE — RESULT ENCOUNTER NOTE
Patient transferred to [facility].  Verified patient still on same unit.  Faxed blood cx and wound cx results.

## 2019-05-10 LAB
BACTERIA SPEC CULT: ABNORMAL
Lab: ABNORMAL
SPECIMEN SOURCE: ABNORMAL
SPECIMEN SOURCE: ABNORMAL

## 2019-05-13 LAB
BACTERIA SPEC CULT: NORMAL
SPECIMEN SOURCE: NORMAL

## 2019-05-14 ENCOUNTER — TELEPHONE (OUTPATIENT)
Dept: INFUSION THERAPY | Facility: OTHER | Age: 69
End: 2019-05-14

## 2019-05-14 ENCOUNTER — TELEPHONE (OUTPATIENT)
Dept: INFUSION THERAPY | Facility: OTHER | Age: 69
End: 2019-05-14
Payer: MEDICARE

## 2019-05-14 DIAGNOSIS — Z53.9 ERRONEOUS ENCOUNTER--DISREGARD: Primary | ICD-10-CM

## 2019-05-14 NOTE — TELEPHONE ENCOUNTER
Received message from Idalmis with [facility] at [facility] stating she was returning a call she placed earlier today confirming infusion to be done this Friday. Call back, spoke with nurse Hanh, who took over for previous nurse, noting patient discharged today and that Idalmis noted she set up infusion with Dr Cervantes's nurse. Reports Dr Cervantes would be ordering Vancomycin 1 gram to be given this Friday the 17th, and for PICC to be pulled after. However we did not receive orders and no notes in chart from Dr Cervantes's nurse, and no one on this unit spoke with her today per report. Call to Dr Cervantes's office, spoke with nurse Meadows who reviewed with MD and he doesn't know anything about this. Patient's PCP is listed as Dr Fermin, who is a [facility] provider. Call back to Hanh with [facility] and updated on above. Explained they will need to do further care coordination, figure out which doctor Idalmis spoke to, and get us orders faxed before we can get patient on the schedule. She will have this addressed tomorrow as [facility] is now closed and will update us accordingly. Will await orders.

## 2019-05-15 ENCOUNTER — MEDICAL CORRESPONDENCE (OUTPATIENT)
Dept: HEALTH INFORMATION MANAGEMENT | Facility: CLINIC | Age: 69
End: 2019-05-15

## 2019-05-15 DIAGNOSIS — L03.119 CELLULITIS OF LOWER EXTREMITY: ICD-10-CM

## 2019-05-15 NOTE — PROGRESS NOTES
Received signed orders from Dr Cortes Cervantes with [facility] for Vancomycin 1g IV once on Friday 5-17-19, and ok to pull PICC once Vanco is done. Call to [facility] asking for Dx. Note to [facility] to call patient and get him on the schedule.

## 2019-05-15 NOTE — TELEPHONE ENCOUNTER
Call to [facility] to see if patient was referred to them and we were called by mistake re infusion. Spoke with Camilo Villatoro RN who reviewed all recent referrals and he is not one of them and is not a current patient of theirs. Will continue to await orders.

## 2019-05-16 PROBLEM — L03.119 CELLULITIS OF LOWER EXTREMITY: Status: ACTIVE | Noted: 2019-05-16

## 2019-05-16 RX ORDER — VANCOMYCIN HYDROCHLORIDE 1 G/200ML
1000 INJECTION, SOLUTION INTRAVENOUS ONCE
Status: CANCELLED | OUTPATIENT
Start: 2019-05-16

## 2019-05-16 NOTE — PROGRESS NOTES
Call again to [facility] with request for Dx for Vancomycin. Transferred to hospital  who reports Dx Cellulitis of leg. Unclear which leg. Plan entered. Orders sent to Dr Fermin for co-sign. Patient is scheduled. Pharmacy notes drug availability.

## 2019-05-17 ENCOUNTER — INFUSION THERAPY VISIT (OUTPATIENT)
Dept: INFUSION THERAPY | Facility: OTHER | Age: 69
End: 2019-05-17
Attending: FAMILY MEDICINE
Payer: MEDICARE

## 2019-05-17 VITALS
WEIGHT: 315 LBS | RESPIRATION RATE: 22 BRPM | BODY MASS INDEX: 40.43 KG/M2 | DIASTOLIC BLOOD PRESSURE: 97 MMHG | SYSTOLIC BLOOD PRESSURE: 156 MMHG | OXYGEN SATURATION: 95 % | HEIGHT: 74 IN | HEART RATE: 66 BPM | TEMPERATURE: 98 F

## 2019-05-17 DIAGNOSIS — L03.119 CELLULITIS OF LOWER EXTREMITY: Primary | ICD-10-CM

## 2019-05-17 PROCEDURE — 96523 IRRIG DRUG DELIVERY DEVICE: CPT

## 2019-05-17 PROCEDURE — 25000128 H RX IP 250 OP 636: Performed by: FAMILY MEDICINE

## 2019-05-17 PROCEDURE — 96365 THER/PROPH/DIAG IV INF INIT: CPT

## 2019-05-17 RX ORDER — VANCOMYCIN HYDROCHLORIDE 1 G/200ML
1000 INJECTION, SOLUTION INTRAVENOUS ONCE
Status: CANCELLED | OUTPATIENT
Start: 2019-05-18

## 2019-05-17 RX ORDER — VANCOMYCIN HYDROCHLORIDE 1 G/200ML
1000 INJECTION, SOLUTION INTRAVENOUS ONCE
Status: COMPLETED | OUTPATIENT
Start: 2019-05-17 | End: 2019-05-17

## 2019-05-17 RX ADMIN — VANCOMYCIN HYDROCHLORIDE 1000 MG: 1 INJECTION, SOLUTION INTRAVENOUS at 13:42

## 2019-05-17 ASSESSMENT — MIFFLIN-ST. JEOR: SCORE: 2655

## 2019-05-17 NOTE — PROGRESS NOTES
Patient is a 67 y/o male here accompanied by spouse today for infusion of IV Vancomycin per order of Dr Fermin.  Patient meets parameters for today's infusion. Patient identified with two identifiers, order verified, and verbal consent for today's infusion obtained from patient.            1342 Vancomycin 1000 mg IV pump verified with dose, drug, and rate of administration.  Infusion administered per protocol.  Patient tolerated infusion well, no signs or symptoms of adverse reaction noted.  Patient denies pain nor discomfort.     Patient here for removal of power PICC under orders of Dr. Fermin.   Insertion site of PICC line right arm.  Procedure explained  to patient.  Patient positioned in supine position.  Instructed to turn head away and keep arm still, insertion arm extended 45-90 degrees.   Sterile central line dressing change kit opened, extra pair of sterile gloves donned.  Sterile drape placed beneath patients arm.  Old dressing removed, alcohol used to remove stat lock.  New pair of sterile gloves donned, insertion site cleansed X 30 second with chlorhexidine.  Catheter grasped near insertion site and withdrawn keeping parallel to arm using firm gentle motion.  Insertion site covered with sterile gauze followed by occlusive dressing and wrapped with pressure dressing.  Patient instructed to leave on X 24 hours.  Instructed to notify provider with any s/sx infection including increased redness, swelling warmth or drainage, to avoid any strenuous exercises or do any heavy lifting for 24 hours.  Verbalizes understanding.    Insertion site free of signs of infection. Patient tolerated procedure well.

## 2019-10-25 ENCOUNTER — RESULTS ONLY (OUTPATIENT)
Dept: LAB | Age: 69
End: 2019-10-25

## 2019-10-25 LAB
APPEARANCE FLD: CLEAR
COLOR FLD: COLORLESS
GRAM STN SPEC: NORMAL
Lab: NORMAL
MONOS+MACROS NFR FLD MANUAL: 8 %
NEUTS BAND NFR FLD MANUAL: 92 %
RBC # FLD: 0 /UL
SPECIMEN SOURCE FLD: NORMAL
SPECIMEN SOURCE: NORMAL
WBC # FLD AUTO: 1528 /UL

## 2019-10-25 PROCEDURE — 87205 SMEAR GRAM STAIN: CPT | Performed by: INTERNAL MEDICINE

## 2019-10-25 PROCEDURE — 87070 CULTURE OTHR SPECIMN AEROBIC: CPT | Mod: AY | Performed by: INTERNAL MEDICINE

## 2019-10-25 PROCEDURE — 87077 CULTURE AEROBIC IDENTIFY: CPT | Performed by: INTERNAL MEDICINE

## 2019-10-25 PROCEDURE — 87186 SC STD MICRODIL/AGAR DIL: CPT | Performed by: INTERNAL MEDICINE

## 2019-10-29 LAB
BACTERIA SPEC CULT: ABNORMAL
Lab: ABNORMAL
SPECIMEN SOURCE: ABNORMAL

## (undated) DEVICE — LABEL-STERILE PREPRINTED FOR OR

## (undated) DEVICE — SENSOR-OXISENSOR II ADULT

## (undated) DEVICE — GLV-7.0 PROTEXIS PI CLASSIC LF/PF

## (undated) DEVICE — PACK-PHACO STELLARIS

## (undated) DEVICE — LENS DELIVERY SYSTEM-SOFPORT LI61AO (EZ-28)

## (undated) DEVICE — BETADINE 5% STERILE OPHTHALMIC SOLUTION 1 OZ.

## (undated) DEVICE — BIN-CATARACT BIN

## (undated) DEVICE — HANDPIECE-CAPSULEGUARD I/A STELLARIS

## (undated) DEVICE — BIN-LENS IMPLANT CART

## (undated) DEVICE — KNIFE-MICRO UNITOME 5.0MM

## (undated) DEVICE — CYSTOTOME-IRRIGATING  25G

## (undated) DEVICE — INSTRUMENT WIPE-VISIWIPE

## (undated) DEVICE — GLV-7.5 PROTEXIS PI CLASSIC LF/PF

## (undated) DEVICE — KNIFE-KERATOME SLIT 2.8MM

## (undated) DEVICE — CANNULA-NUCLEUS HYDRODISSECTOR

## (undated) DEVICE — BIN-TECNIS DCB00 LENSES

## (undated) DEVICE — PACK-EYE-CUSTOM